# Patient Record
Sex: FEMALE | Race: BLACK OR AFRICAN AMERICAN | Employment: FULL TIME | URBAN - METROPOLITAN AREA
[De-identification: names, ages, dates, MRNs, and addresses within clinical notes are randomized per-mention and may not be internally consistent; named-entity substitution may affect disease eponyms.]

---

## 2017-01-10 ENCOUNTER — OFFICE VISIT (OUTPATIENT)
Dept: INTERNAL MEDICINE CLINIC | Facility: CLINIC | Age: 32
End: 2017-01-10

## 2017-01-10 ENCOUNTER — TELEPHONE (OUTPATIENT)
Dept: INTERNAL MEDICINE CLINIC | Facility: CLINIC | Age: 32
End: 2017-01-10

## 2017-01-10 VITALS
WEIGHT: 226 LBS | BODY MASS INDEX: 41.59 KG/M2 | TEMPERATURE: 98 F | RESPIRATION RATE: 16 BRPM | HEART RATE: 84 BPM | HEIGHT: 62 IN | DIASTOLIC BLOOD PRESSURE: 81 MMHG | SYSTOLIC BLOOD PRESSURE: 138 MMHG

## 2017-01-10 DIAGNOSIS — J40 BRONCHITIS: ICD-10-CM

## 2017-01-10 DIAGNOSIS — IMO0001 COLD: Primary | ICD-10-CM

## 2017-01-10 PROCEDURE — 99213 OFFICE O/P EST LOW 20 MIN: CPT | Performed by: INTERNAL MEDICINE

## 2017-01-10 PROCEDURE — 99212 OFFICE O/P EST SF 10 MIN: CPT | Performed by: INTERNAL MEDICINE

## 2017-01-10 RX ORDER — AMOXICILLIN AND CLAVULANATE POTASSIUM 500; 125 MG/1; MG/1
1 TABLET, FILM COATED ORAL
Qty: 20 TABLET | Refills: 0 | Status: SHIPPED | OUTPATIENT
Start: 2017-01-10 | End: 2017-06-21

## 2017-01-10 NOTE — TELEPHONE ENCOUNTER
Onset: 2 weeks. Pt c/o chest congestion, productive cough ( blood tinged sputum this AM but reports normally is yellow phlegm), nasal congestion, sinus congestion, headache. Pt denied fever, SOB, wheezing.  During conversation, hoarseness in voice noted, an

## 2017-01-10 NOTE — PROGRESS NOTES
HPI:    Patient ID: Nargis Saeed is a 27year old female. Cough  This is a new problem. The current episode started 1 to 4 weeks ago. The problem has been gradually worsening. The problem occurs constantly.  The cough is productive of blood-tinged Lymphadenopathy:     She has no cervical adenopathy. Skin: She is not diaphoretic. Nursing note and vitals reviewed.              ASSESSMENT/PLAN:   Cold  (primary encounter diagnosis)  Bronchitis    No orders of the defined types were placed in this

## 2017-01-10 NOTE — TELEPHONE ENCOUNTER
Noted, thank you Bella  Future Appointments  Date Time Provider Yogi Blair   1/10/2017 11:20 AM Yvan Martinez MD [facility]   5/24/2017 5:40 PM Carolyn Krueger DO [facility]

## 2017-01-10 NOTE — TELEPHONE ENCOUNTER
Pt calling regarding having a really bad cold pt coughing really hard and blood comes up with it. .. please advise

## 2017-04-17 ENCOUNTER — TELEPHONE (OUTPATIENT)
Dept: OBGYN CLINIC | Facility: CLINIC | Age: 32
End: 2017-04-17

## 2017-04-17 RX ORDER — NORGESTIMATE AND ETHINYL ESTRADIOL 7DAYSX3 LO
1 KIT ORAL
Qty: 1 PACKAGE | Refills: 2 | Status: SHIPPED | OUTPATIENT
Start: 2017-04-17 | End: 2017-06-21

## 2017-04-17 NOTE — TELEPHONE ENCOUNTER
Pt informed refill until annual will be sent to pharmacy and to please make sure does not miss appt. Pt states understanding. Pt's last annual with PAULINO was on 5/18/16, last pap negative and HPV positive with negative 16, 18, and 45.  Pt has appt for annual

## 2017-04-26 ENCOUNTER — TELEPHONE (OUTPATIENT)
Dept: INTERNAL MEDICINE CLINIC | Facility: CLINIC | Age: 32
End: 2017-04-26

## 2017-06-15 ENCOUNTER — TELEPHONE (OUTPATIENT)
Dept: INTERNAL MEDICINE CLINIC | Facility: CLINIC | Age: 32
End: 2017-06-15

## 2017-06-15 NOTE — TELEPHONE ENCOUNTER
Pt asking to have her labs ordered prior to THE [facility] appt   Pt stts her labs has to be done @ [facility] and asking for them to be sent to [facility]

## 2017-06-21 ENCOUNTER — LAB ENCOUNTER (OUTPATIENT)
Dept: LAB | Facility: HOSPITAL | Age: 32
End: 2017-06-21
Attending: INTERNAL MEDICINE
Payer: COMMERCIAL

## 2017-06-21 ENCOUNTER — OFFICE VISIT (OUTPATIENT)
Dept: OBGYN CLINIC | Facility: CLINIC | Age: 32
End: 2017-06-21

## 2017-06-21 ENCOUNTER — OFFICE VISIT (OUTPATIENT)
Dept: INTERNAL MEDICINE CLINIC | Facility: CLINIC | Age: 32
End: 2017-06-21

## 2017-06-21 VITALS
DIASTOLIC BLOOD PRESSURE: 80 MMHG | HEIGHT: 61.8 IN | TEMPERATURE: 99 F | RESPIRATION RATE: 20 BRPM | HEART RATE: 74 BPM | SYSTOLIC BLOOD PRESSURE: 122 MMHG | WEIGHT: 219.81 LBS | BODY MASS INDEX: 40.45 KG/M2

## 2017-06-21 VITALS
SYSTOLIC BLOOD PRESSURE: 124 MMHG | BODY MASS INDEX: 40 KG/M2 | DIASTOLIC BLOOD PRESSURE: 84 MMHG | HEART RATE: 77 BPM | WEIGHT: 219.63 LBS

## 2017-06-21 DIAGNOSIS — Z00.00 ANNUAL PHYSICAL EXAM: ICD-10-CM

## 2017-06-21 DIAGNOSIS — Z12.4 SCREENING FOR MALIGNANT NEOPLASM OF CERVIX: ICD-10-CM

## 2017-06-21 DIAGNOSIS — Z00.00 ANNUAL PHYSICAL EXAM: Primary | ICD-10-CM

## 2017-06-21 DIAGNOSIS — R87.810 CERVICAL HIGH RISK HUMAN PAPILLOMAVIRUS (HPV) DNA TEST POSITIVE: ICD-10-CM

## 2017-06-21 DIAGNOSIS — D25.1 FIBROIDS, INTRAMURAL: ICD-10-CM

## 2017-06-21 DIAGNOSIS — Z20.2 EXPOSURE TO CHLAMYDIA: ICD-10-CM

## 2017-06-21 DIAGNOSIS — Z01.419 ENCOUNTER FOR GYNECOLOGICAL EXAMINATION WITHOUT ABNORMAL FINDING: Primary | ICD-10-CM

## 2017-06-21 DIAGNOSIS — N92.0 MENORRHAGIA WITH REGULAR CYCLE: ICD-10-CM

## 2017-06-21 PROCEDURE — 99395 PREV VISIT EST AGE 18-39: CPT | Performed by: OBSTETRICS & GYNECOLOGY

## 2017-06-21 PROCEDURE — 85025 COMPLETE CBC W/AUTO DIFF WBC: CPT

## 2017-06-21 PROCEDURE — 99395 PREV VISIT EST AGE 18-39: CPT | Performed by: INTERNAL MEDICINE

## 2017-06-21 PROCEDURE — 80053 COMPREHEN METABOLIC PANEL: CPT

## 2017-06-21 PROCEDURE — 87389 HIV-1 AG W/HIV-1&-2 AB AG IA: CPT

## 2017-06-21 PROCEDURE — 82306 VITAMIN D 25 HYDROXY: CPT

## 2017-06-21 PROCEDURE — 84443 ASSAY THYROID STIM HORMONE: CPT

## 2017-06-21 PROCEDURE — 86780 TREPONEMA PALLIDUM: CPT

## 2017-06-21 PROCEDURE — 36415 COLL VENOUS BLD VENIPUNCTURE: CPT

## 2017-06-21 PROCEDURE — 80061 LIPID PANEL: CPT

## 2017-06-21 PROCEDURE — 86803 HEPATITIS C AB TEST: CPT

## 2017-06-21 PROCEDURE — 87340 HEPATITIS B SURFACE AG IA: CPT

## 2017-06-21 RX ORDER — AZITHROMYCIN 500 MG/1
1000 TABLET, FILM COATED ORAL ONCE
Qty: 2 TABLET | Refills: 0 | Status: SHIPPED | OUTPATIENT
Start: 2017-06-21 | End: 2017-06-21 | Stop reason: ALTCHOICE

## 2017-06-21 RX ORDER — NORGESTIMATE AND ETHINYL ESTRADIOL 7DAYSX3 LO
1 KIT ORAL
Qty: 1 PACKAGE | Refills: 12 | Status: SHIPPED | OUTPATIENT
Start: 2017-06-21 | End: 2018-02-26

## 2017-06-21 NOTE — PROGRESS NOTES
HPI:    Patient ID: Ankush Graham is a 27year old female. HPI       Annual Preventative Exam  Duran Stovall arrives today for annual preventative physical examination. The patient complains of no new problems and has 0/10 pain.  Patient is currently johanny distension and no mass. There is no tenderness. There is no rebound and no guarding. Musculoskeletal: Normal range of motion. She exhibits no edema (in extremities). Neurological: She is alert and oriented to person, place, and time.    Cranial nerves 2

## 2017-06-21 NOTE — PROGRESS NOTES
HPI:    Patient ID: Raymond Mae is a 27year old female. HPI Nulligravida with menses q 27-28d / 4-5d / heavy and painful menses but worse in past 2 months. OCP has helped with menses until these past 2 months. Known fibroids with US 1 year ago. guarding. Genitourinary: Vagina normal. No breast discharge. There is no rash or lesion on the right labia. There is no rash or lesion on the left labia. Uterus is enlarged (12-14 week size). Uterus is not tender.  Cervix exhibits no motion tenderness and

## 2017-06-22 PROBLEM — D25.1 FIBROIDS, INTRAMURAL: Status: ACTIVE | Noted: 2017-06-22

## 2017-06-23 ENCOUNTER — HOSPITAL ENCOUNTER (OUTPATIENT)
Dept: ULTRASOUND IMAGING | Facility: HOSPITAL | Age: 32
Discharge: HOME | End: 2017-06-23
Attending: OBSTETRICS & GYNECOLOGY
Payer: COMMERCIAL

## 2017-06-23 ENCOUNTER — TELEPHONE (OUTPATIENT)
Dept: OBGYN CLINIC | Facility: CLINIC | Age: 32
End: 2017-06-23

## 2017-06-23 DIAGNOSIS — D25.1 FIBROIDS, INTRAMURAL: ICD-10-CM

## 2017-06-23 PROCEDURE — 76830 TRANSVAGINAL US NON-OB: CPT | Performed by: OBSTETRICS & GYNECOLOGY

## 2017-06-23 PROCEDURE — 76856 US EXAM PELVIC COMPLETE: CPT | Performed by: OBSTETRICS & GYNECOLOGY

## 2017-06-23 NOTE — TELEPHONE ENCOUNTER
Spoke with patient concerning all negative STD tests, HP and pap. SHe is actually in [address] [facility] right now and we'll await results.

## 2017-06-24 ENCOUNTER — TELEPHONE (OUTPATIENT)
Dept: OBGYN CLINIC | Facility: CLINIC | Age: 32
End: 2017-06-24

## 2017-06-24 NOTE — TELEPHONE ENCOUNTER
PT NOTIFIED ALL TESTS WERE NORMAL/NEGATIVE. ADVISED STD RESULTS DO NOT APPEAR IN MY CHART AND [employer] that [facility] BE EMAILED. SHE ASKED THAT A COPY OF THE STD BLOOD WORK BE MAILED TO HER.   ADDRESS CONFIRMED AND STD BLOOD WORK AND GC/CHLAMYDIA MAILED TO PT.

## 2017-06-24 NOTE — TELEPHONE ENCOUNTER
PT stated she was locked out mychart advised to reactivate acc. Pt also would like std results emailed.    Estella Medina to [facility] [phone]

## 2017-07-10 NOTE — TELEPHONE ENCOUNTER
Recent blood tests for vitamin D was normal at 51.8. Normal TSH/thyroid test at 0.48. Normal comprehensive metabolic panel. Normal cholesterol panel with LDL of 120, triglycerides 60 cc, non-HDL cholesterol was 133 and HDL was normal at 67.   Unremarkabl

## 2017-09-01 ENCOUNTER — NURSE TRIAGE (OUTPATIENT)
Dept: OTHER | Age: 32
End: 2017-09-01

## 2017-09-01 NOTE — TELEPHONE ENCOUNTER
Action Requested: Summary for Provider     []  Critical Lab, Recommendations Needed  [] Need Additional Advice  []   FYI    []   Need Orders  [] Need Medications Sent to Pharmacy  []  Other     SUMMARY: pt reports cough, chest congestion.   Denies fever, so

## 2017-09-02 ENCOUNTER — OFFICE VISIT (OUTPATIENT)
Dept: INTERNAL MEDICINE CLINIC | Facility: CLINIC | Age: 32
End: 2017-09-02

## 2017-09-02 VITALS
SYSTOLIC BLOOD PRESSURE: 135 MMHG | HEIGHT: 62 IN | WEIGHT: 223.44 LBS | TEMPERATURE: 98 F | DIASTOLIC BLOOD PRESSURE: 86 MMHG | BODY MASS INDEX: 41.12 KG/M2 | HEART RATE: 87 BPM

## 2017-09-02 DIAGNOSIS — J06.9 ACUTE URI: Primary | ICD-10-CM

## 2017-09-02 PROCEDURE — 99212 OFFICE O/P EST SF 10 MIN: CPT | Performed by: INTERNAL MEDICINE

## 2017-09-02 PROCEDURE — 99213 OFFICE O/P EST LOW 20 MIN: CPT | Performed by: INTERNAL MEDICINE

## 2017-09-02 RX ORDER — CODEINE PHOSPHATE AND GUAIFENESIN 10; 100 MG/5ML; MG/5ML
5 SOLUTION ORAL EVERY 6 HOURS PRN
Qty: 1 BOTTLE | Refills: 0 | Status: SHIPPED | OUTPATIENT
Start: 2017-09-02 | End: 2018-02-14

## 2017-09-02 NOTE — PROGRESS NOTES
Timothy Hawley is a 27year old female. Patient presents with:  Cough: Pain in RT side of back. Stuffy nose. HPI:   Since Wednesday, 3 days ago, she has had symptoms of nasal congestion with yellow drainage and cough productive of yellow sputum. wheezes    ASSESSMENT AND PLAN:   1. Acute URI  Likely viral.  Symptomatic treatment-Cheratussin AC 5 cc 4 times daily as needed. Prescription given. Call if not soon better. Would then consider an empiric course of antibiotics.     The patient indicates

## 2017-09-11 ENCOUNTER — HOSPITAL ENCOUNTER (OUTPATIENT)
Age: 32
Discharge: HOME | End: 2017-09-11
Attending: FAMILY MEDICINE
Payer: COMMERCIAL

## 2017-09-11 VITALS
RESPIRATION RATE: 22 BRPM | DIASTOLIC BLOOD PRESSURE: 85 MMHG | SYSTOLIC BLOOD PRESSURE: 130 MMHG | HEART RATE: 100 BPM | TEMPERATURE: 98 F | OXYGEN SATURATION: 100 %

## 2017-09-11 DIAGNOSIS — L03.113 CELLULITIS OF RIGHT UPPER EXTREMITY: Primary | ICD-10-CM

## 2017-09-11 PROCEDURE — 99213 OFFICE O/P EST LOW 20 MIN: CPT

## 2017-09-11 PROCEDURE — 99204 OFFICE O/P NEW MOD 45 MIN: CPT

## 2017-09-11 RX ORDER — CEPHALEXIN 500 MG/1
500 CAPSULE ORAL 3 TIMES DAILY
Qty: 30 CAPSULE | Refills: 0 | Status: SHIPPED | OUTPATIENT
Start: 2017-09-11 | End: 2017-09-21

## 2017-09-11 NOTE — ED INITIAL ASSESSMENT (HPI)
Pt reports swelling, itching, pain to dorsal aspect of right hand, rates pain 5/10, describes as a tightness. States that she was at a family part outside on Saturday and when she woke up on Sunday and noted swelling.  Soaked it in epsom salts with some rel

## 2017-09-11 NOTE — ED PROVIDER NOTES
Patient Seen in: [address]    History   Patient presents with:  Rash Skin Problem (integumentary)    Stated Complaint: INSECT BITE    HPI    58-year-old female presents to days after possible bug bite on the top of her Physical Exam   Constitutional: She is oriented to person, place, and time. She appears well-developed and well-nourished. Cardiovascular: Normal rate, regular rhythm, normal heart sounds and intact distal pulses.     Pulmonary/Chest: Effort barbara

## 2017-09-11 NOTE — ED NOTES
Pt given discharge instructions and prescription, verbalizes understanding. Denies further questions. Encouraged to call with questions and go to ED with new or worsening symptoms. Ambulatory out of immediate care with steady gait in no apparent distress.

## 2018-02-12 ENCOUNTER — TELEPHONE (OUTPATIENT)
Dept: PEDIATRICS CLINIC | Facility: CLINIC | Age: 33
End: 2018-02-12

## 2018-02-12 DIAGNOSIS — D25.1 INTRAMURAL LEIOMYOMA OF UTERUS: Primary | ICD-10-CM

## 2018-02-12 DIAGNOSIS — N92.0 MENORRHAGIA WITH REGULAR CYCLE: ICD-10-CM

## 2018-02-12 NOTE — TELEPHONE ENCOUNTER
Pt has an appt with PAULINO on 2/14/18. She wants to know if she can do an ultrasound before her appt with PAULINO for fibroids. Pt states she has fibroids and she has had been feeling pressure and discomfort in her lower abd area, below her belly button.   Eyad coe

## 2018-02-12 NOTE — TELEPHONE ENCOUNTER
Chart and previous US reviewed. I will place order as f/u to June 2017 imaging but I'm not sure if radiology has appt open. This is not an emergency and could be done after visit. I'd like her to check CBC as well. Orders are sent.

## 2018-02-14 ENCOUNTER — APPOINTMENT (OUTPATIENT)
Dept: LAB | Facility: HOSPITAL | Age: 33
End: 2018-02-14
Attending: OBSTETRICS & GYNECOLOGY
Payer: COMMERCIAL

## 2018-02-14 ENCOUNTER — OFFICE VISIT (OUTPATIENT)
Dept: OBGYN CLINIC | Facility: CLINIC | Age: 33
End: 2018-02-14

## 2018-02-14 VITALS
DIASTOLIC BLOOD PRESSURE: 82 MMHG | HEART RATE: 78 BPM | BODY MASS INDEX: 42 KG/M2 | WEIGHT: 229 LBS | SYSTOLIC BLOOD PRESSURE: 126 MMHG

## 2018-02-14 DIAGNOSIS — D50.9 MICROCYTIC ANEMIA: ICD-10-CM

## 2018-02-14 DIAGNOSIS — N92.0 MENORRHAGIA WITH REGULAR CYCLE: Primary | ICD-10-CM

## 2018-02-14 DIAGNOSIS — N92.0 MENORRHAGIA WITH REGULAR CYCLE: ICD-10-CM

## 2018-02-14 DIAGNOSIS — Z11.3 VENEREAL DISEASE SCREENING: ICD-10-CM

## 2018-02-14 DIAGNOSIS — D25.1 INTRAMURAL LEIOMYOMA OF UTERUS: ICD-10-CM

## 2018-02-14 LAB
ERYTHROCYTE [DISTWIDTH] IN BLOOD BY AUTOMATED COUNT: 15.9 % (ref 11–15)
HCT VFR BLD AUTO: 37.7 % (ref 35–48)
HGB BLD-MCNC: 11.8 G/DL (ref 12–16)
MCH RBC QN AUTO: 24.4 PG (ref 27–32)
MCHC RBC AUTO-ENTMCNC: 31.2 G/DL (ref 32–37)
MCV RBC AUTO: 78.1 FL (ref 80–100)
PLATELET # BLD AUTO: 317 K/UL (ref 140–400)
PMV BLD AUTO: 8.5 FL (ref 7.4–10.3)
RBC # BLD AUTO: 4.83 M/UL (ref 3.7–5.4)
WBC # BLD AUTO: 7.5 K/UL (ref 4–11)

## 2018-02-14 PROCEDURE — 36415 COLL VENOUS BLD VENIPUNCTURE: CPT

## 2018-02-14 PROCEDURE — 85027 COMPLETE CBC AUTOMATED: CPT

## 2018-02-14 PROCEDURE — 99213 OFFICE O/P EST LOW 20 MIN: CPT | Performed by: OBSTETRICS & GYNECOLOGY

## 2018-02-14 RX ORDER — NORGESTIMATE AND ETHINYL ESTRADIOL 7DAYSX3 28
1 KIT ORAL
Refills: 12 | COMMUNITY
Start: 2018-01-24 | End: 2018-06-27

## 2018-02-16 ENCOUNTER — HOSPITAL ENCOUNTER (OUTPATIENT)
Dept: ULTRASOUND IMAGING | Facility: HOSPITAL | Age: 33
Discharge: HOME | End: 2018-02-16
Attending: OBSTETRICS & GYNECOLOGY
Payer: COMMERCIAL

## 2018-02-16 ENCOUNTER — APPOINTMENT (OUTPATIENT)
Dept: LAB | Facility: HOSPITAL | Age: 33
End: 2018-02-16
Attending: OBSTETRICS & GYNECOLOGY
Payer: COMMERCIAL

## 2018-02-16 DIAGNOSIS — N92.0 MENORRHAGIA WITH REGULAR CYCLE: ICD-10-CM

## 2018-02-16 DIAGNOSIS — Z11.3 VENEREAL DISEASE SCREENING: ICD-10-CM

## 2018-02-16 DIAGNOSIS — D25.1 INTRAMURAL LEIOMYOMA OF UTERUS: ICD-10-CM

## 2018-02-16 LAB
C TRACH DNA SPEC QL NAA+PROBE: NEGATIVE
N GONORRHOEA DNA SPEC QL NAA+PROBE: NEGATIVE
T VAGINALIS RRNA SPEC QL NAA+PROBE: NEGATIVE

## 2018-02-16 PROCEDURE — 87389 HIV-1 AG W/HIV-1&-2 AB AG IA: CPT

## 2018-02-16 PROCEDURE — 86780 TREPONEMA PALLIDUM: CPT

## 2018-02-16 PROCEDURE — 36415 COLL VENOUS BLD VENIPUNCTURE: CPT

## 2018-02-16 PROCEDURE — 86803 HEPATITIS C AB TEST: CPT

## 2018-02-16 PROCEDURE — 76856 US EXAM PELVIC COMPLETE: CPT | Performed by: OBSTETRICS & GYNECOLOGY

## 2018-02-16 PROCEDURE — 76830 TRANSVAGINAL US NON-OB: CPT | Performed by: OBSTETRICS & GYNECOLOGY

## 2018-02-16 PROCEDURE — 87340 HEPATITIS B SURFACE AG IA: CPT

## 2018-02-19 ENCOUNTER — TELEPHONE (OUTPATIENT)
Dept: OBGYN CLINIC | Facility: CLINIC | Age: 33
End: 2018-02-19

## 2018-02-19 DIAGNOSIS — N92.0 MENORRHAGIA WITH REGULAR CYCLE: Primary | ICD-10-CM

## 2018-02-19 DIAGNOSIS — D25.9 UTERINE LEIOMYOMA, UNSPECIFIED LOCATION: ICD-10-CM

## 2018-02-19 LAB
HBV SURFACE AG SERPL QL IA: NONREACTIVE
HCV AB SERPL QL IA: NONREACTIVE
HIV1+2 AB SERPL QL IA: NONREACTIVE
T PALLIDUM AB SER QL: NEGATIVE

## 2018-02-19 NOTE — TELEPHONE ENCOUNTER
Pt spoke to PAULINO about abdominal myomectomy (see 2/16 US result note). She wants to schedule the surgery. Pt aware this will be sent to surgery scheduler. Routed to Roxana to please call pt, thanks.

## 2018-02-19 NOTE — TELEPHONE ENCOUNTER
OB GYN SURGICAL SCHEDULING    Assessment: Menorrhagia with regular cycle and Uterine fibroids    Pre-Operative Procedure:  Laparotomy with abdominal myomectomy    Side: neither    In / on:     Date:      Admission:  AM Admit    Anesthesia: General    Addit

## 2018-02-19 NOTE — TELEPHONE ENCOUNTER
Spoke with patient about tentative date 2/26 and 2/27. Patient informed I will c/b to confirm when Md confirms his availability.

## 2018-02-21 ENCOUNTER — TELEPHONE (OUTPATIENT)
Dept: OBGYN CLINIC | Facility: CLINIC | Age: 33
End: 2018-02-21

## 2018-02-21 NOTE — TELEPHONE ENCOUNTER
FMLA form received in [employer]. Logged for processing. [employer] packet emailed to pt ([email or web address]).  NK

## 2018-02-21 NOTE — TELEPHONE ENCOUNTER
Roxana, I am not returning from out of town until that evening. That's why I moved the other . Alivia 105 to Tuesday AM. Is Wed AM the 14th available?

## 2018-02-21 NOTE — TELEPHONE ENCOUNTER
LA paperwork received via and given to [facility] at the [employer] department.

## 2018-02-21 NOTE — TELEPHONE ENCOUNTER
Spoke with patient and talked about tentative procedure date 3/9/18.  Awaiting a confirmation from MD.

## 2018-02-22 NOTE — TELEPHONE ENCOUNTER
Patient is scheduled 3/2/18 at 2pm Laparotomy with abdominal myomectomy PAULINO/BRADEN. Patient's instructions routed via [employer]. Patient informed.      Per patient's insurance it is billed as outpatient (23 hour observation) and if the patient was exceeds 23 mounika

## 2018-02-26 NOTE — TELEPHONE ENCOUNTER
Dr. Richie Zimmerman    Please sign off on form:  -Highlight the patient and hit \"Chart\" button. -In Chart Review, w/in the Encounter tab - click 1 time on the Telephone call encounter for 2-21-18.  Scroll down the telephone encounter.  -Click \"scan on\" blue Hy

## 2018-02-26 NOTE — PROGRESS NOTES
HPI:    Patient ID: Raymond Mae is a 34year old female. HPI  Nulligravida with menses q 28d / 4-7d / 5 extreme flow days despite OCP use for BC and menstrual control.  No current plan for pregnancy but wishes to discuss fibroids and future pregn Genitourinary: Vagina normal. No breast discharge. There is no rash or lesion on the right labia. There is no rash or lesion on the left labia. Uterus is enlarged (14 week size. ). Uterus is not tender.  Cervix exhibits no motion tenderness and no dischar

## 2018-03-01 ENCOUNTER — TELEPHONE (OUTPATIENT)
Dept: OBGYN CLINIC | Facility: CLINIC | Age: 33
End: 2018-03-01

## 2018-03-01 ENCOUNTER — LAB ENCOUNTER (OUTPATIENT)
Dept: LAB | Facility: HOSPITAL | Age: 33
End: 2018-03-01
Attending: OBSTETRICS & GYNECOLOGY
Payer: COMMERCIAL

## 2018-03-01 DIAGNOSIS — Z01.818 PRE-OP TESTING: ICD-10-CM

## 2018-03-01 LAB
ANTIBODY SCREEN: NEGATIVE
RH BLOOD TYPE: POSITIVE

## 2018-03-01 PROCEDURE — 86900 BLOOD TYPING SEROLOGIC ABO: CPT

## 2018-03-01 PROCEDURE — 86901 BLOOD TYPING SEROLOGIC RH(D): CPT

## 2018-03-01 PROCEDURE — 36415 COLL VENOUS BLD VENIPUNCTURE: CPT

## 2018-03-01 PROCEDURE — 86850 RBC ANTIBODY SCREEN: CPT

## 2018-03-01 NOTE — TELEPHONE ENCOUNTER
Per the pt she has surgery scheduled for tomorrow, and would like to know if she can eat before hand. Please advise.

## 2018-03-01 NOTE — TELEPHONE ENCOUNTER
Pt clarifying diet for today, having laparotomy tomorrow. Pt advised that per her pre op instructions, she can only consume clear liquids today. Pt heading to hospital for labs now and will do enema tonight.  Pt advised to call back with any further questio

## 2018-03-02 ENCOUNTER — SURGERY (OUTPATIENT)
Age: 33
End: 2018-03-02

## 2018-03-02 ENCOUNTER — HOSPITAL ENCOUNTER (INPATIENT)
Facility: HOSPITAL | Age: 33
LOS: 3 days | Discharge: HOME | DRG: 742 | End: 2018-03-05
Attending: OBSTETRICS & GYNECOLOGY | Admitting: OBSTETRICS & GYNECOLOGY
Payer: COMMERCIAL

## 2018-03-02 ENCOUNTER — ANESTHESIA EVENT (OUTPATIENT)
Dept: SURGERY | Facility: HOSPITAL | Age: 33
DRG: 742 | End: 2018-03-02
Payer: COMMERCIAL

## 2018-03-02 ENCOUNTER — ANESTHESIA (OUTPATIENT)
Dept: SURGERY | Facility: HOSPITAL | Age: 33
DRG: 742 | End: 2018-03-02
Payer: COMMERCIAL

## 2018-03-02 DIAGNOSIS — N92.0 MENORRHAGIA WITH REGULAR CYCLE: ICD-10-CM

## 2018-03-02 DIAGNOSIS — D25.9 UTERINE LEIOMYOMA, UNSPECIFIED LOCATION: ICD-10-CM

## 2018-03-02 DIAGNOSIS — Z01.818 PRE-OP TESTING: Primary | ICD-10-CM

## 2018-03-02 PROBLEM — Z98.890 STATUS POST MYOMECTOMY: Status: ACTIVE | Noted: 2018-03-02

## 2018-03-02 PROBLEM — D50.9 HYPOCHROMIC MICROCYTIC ANEMIA: Status: ACTIVE | Noted: 2018-03-02

## 2018-03-02 LAB — B-HCG UR QL: NEGATIVE

## 2018-03-02 PROCEDURE — 58140 MYOMECTOMY ABDOM METHOD: CPT | Performed by: OBSTETRICS & GYNECOLOGY

## 2018-03-02 PROCEDURE — 0UB90ZZ EXCISION OF UTERUS, OPEN APPROACH: ICD-10-PCS | Performed by: OBSTETRICS & GYNECOLOGY

## 2018-03-02 PROCEDURE — 3E0P05Z INTRODUCTION OF ADHESION BARRIER INTO FEMALE REPRODUCTIVE, OPEN APPROACH: ICD-10-PCS | Performed by: OBSTETRICS & GYNECOLOGY

## 2018-03-02 RX ORDER — SODIUM CHLORIDE, SODIUM LACTATE, POTASSIUM CHLORIDE, CALCIUM CHLORIDE 600; 310; 30; 20 MG/100ML; MG/100ML; MG/100ML; MG/100ML
INJECTION, SOLUTION INTRAVENOUS CONTINUOUS
Status: DISCONTINUED | OUTPATIENT
Start: 2018-03-02 | End: 2018-03-05

## 2018-03-02 RX ORDER — MORPHINE SULFATE 2 MG/ML
2 INJECTION, SOLUTION INTRAMUSCULAR; INTRAVENOUS EVERY 10 MIN PRN
Status: DISCONTINUED | OUTPATIENT
Start: 2018-03-02 | End: 2018-03-02 | Stop reason: HOSPADM

## 2018-03-02 RX ORDER — ONDANSETRON 4 MG/1
4 TABLET, FILM COATED ORAL EVERY 8 HOURS PRN
Status: DISCONTINUED | OUTPATIENT
Start: 2018-03-02 | End: 2018-03-05

## 2018-03-02 RX ORDER — ONDANSETRON 2 MG/ML
4 INJECTION INTRAMUSCULAR; INTRAVENOUS EVERY 8 HOURS PRN
Status: DISCONTINUED | OUTPATIENT
Start: 2018-03-02 | End: 2018-03-05

## 2018-03-02 RX ORDER — FAMOTIDINE 20 MG/1
20 TABLET ORAL ONCE
Status: COMPLETED | OUTPATIENT
Start: 2018-03-02 | End: 2018-03-02

## 2018-03-02 RX ORDER — ONDANSETRON 2 MG/ML
4 INJECTION INTRAMUSCULAR; INTRAVENOUS ONCE AS NEEDED
Status: DISCONTINUED | OUTPATIENT
Start: 2018-03-02 | End: 2018-03-02 | Stop reason: HOSPADM

## 2018-03-02 RX ORDER — DOCUSATE SODIUM 100 MG/1
100 CAPSULE, LIQUID FILLED ORAL DAILY
Status: DISCONTINUED | OUTPATIENT
Start: 2018-03-02 | End: 2018-03-05

## 2018-03-02 RX ORDER — ACETAMINOPHEN 500 MG
1000 TABLET ORAL ONCE
Status: COMPLETED | OUTPATIENT
Start: 2018-03-02 | End: 2018-03-02

## 2018-03-02 RX ORDER — ROCURONIUM BROMIDE 10 MG/ML
INJECTION, SOLUTION INTRAVENOUS AS NEEDED
Status: DISCONTINUED | OUTPATIENT
Start: 2018-03-02 | End: 2018-03-02 | Stop reason: SURG

## 2018-03-02 RX ORDER — SODIUM CHLORIDE 0.9 % (FLUSH) 0.9 %
10 SYRINGE (ML) INJECTION AS NEEDED
Status: DISCONTINUED | OUTPATIENT
Start: 2018-03-02 | End: 2018-03-02 | Stop reason: HOSPADM

## 2018-03-02 RX ORDER — NALOXONE HYDROCHLORIDE 0.4 MG/ML
80 INJECTION, SOLUTION INTRAMUSCULAR; INTRAVENOUS; SUBCUTANEOUS AS NEEDED
Status: DISCONTINUED | OUTPATIENT
Start: 2018-03-02 | End: 2018-03-02 | Stop reason: HOSPADM

## 2018-03-02 RX ORDER — MORPHINE SULFATE 2 MG/ML
2 INJECTION, SOLUTION INTRAMUSCULAR; INTRAVENOUS EVERY 30 MIN PRN
Status: DISCONTINUED | OUTPATIENT
Start: 2018-03-02 | End: 2018-03-05

## 2018-03-02 RX ORDER — BISACODYL 10 MG
10 SUPPOSITORY, RECTAL RECTAL
Status: DISCONTINUED | OUTPATIENT
Start: 2018-03-02 | End: 2018-03-05

## 2018-03-02 RX ORDER — DEXTROSE, SODIUM CHLORIDE, SODIUM LACTATE, POTASSIUM CHLORIDE, AND CALCIUM CHLORIDE 5; .6; .31; .03; .02 G/100ML; G/100ML; G/100ML; G/100ML; G/100ML
INJECTION, SOLUTION INTRAVENOUS CONTINUOUS
Status: DISCONTINUED | OUTPATIENT
Start: 2018-03-02 | End: 2018-03-04

## 2018-03-02 RX ORDER — LIDOCAINE HYDROCHLORIDE 10 MG/ML
INJECTION, SOLUTION EPIDURAL; INFILTRATION; INTRACAUDAL; PERINEURAL AS NEEDED
Status: DISCONTINUED | OUTPATIENT
Start: 2018-03-02 | End: 2018-03-02 | Stop reason: SURG

## 2018-03-02 RX ORDER — SODIUM CHLORIDE 0.9 % (FLUSH) 0.9 %
10 SYRINGE (ML) INJECTION AS NEEDED
Status: DISCONTINUED | OUTPATIENT
Start: 2018-03-02 | End: 2018-03-05

## 2018-03-02 RX ORDER — NALBUPHINE HCL 10 MG/ML
2.5 AMPUL (ML) INJECTION EVERY 4 HOURS PRN
Status: DISCONTINUED | OUTPATIENT
Start: 2018-03-02 | End: 2018-03-05

## 2018-03-02 RX ORDER — HEPARIN SODIUM 5000 [USP'U]/ML
5000 INJECTION, SOLUTION INTRAVENOUS; SUBCUTANEOUS ONCE
Status: DISCONTINUED | OUTPATIENT
Start: 2018-03-03 | End: 2018-03-02

## 2018-03-02 RX ORDER — HEPARIN SODIUM 5000 [USP'U]/ML
5000 INJECTION, SOLUTION INTRAVENOUS; SUBCUTANEOUS ONCE
Status: COMPLETED | OUTPATIENT
Start: 2018-03-02 | End: 2018-03-02

## 2018-03-02 RX ORDER — MORPHINE SULFATE 10 MG/ML
6 INJECTION, SOLUTION INTRAMUSCULAR; INTRAVENOUS EVERY 10 MIN PRN
Status: DISCONTINUED | OUTPATIENT
Start: 2018-03-02 | End: 2018-03-02 | Stop reason: HOSPADM

## 2018-03-02 RX ORDER — DIPHENHYDRAMINE HYDROCHLORIDE 50 MG/ML
12.5 INJECTION INTRAMUSCULAR; INTRAVENOUS EVERY 4 HOURS PRN
Status: DISCONTINUED | OUTPATIENT
Start: 2018-03-02 | End: 2018-03-05

## 2018-03-02 RX ORDER — GLYCOPYRROLATE 0.2 MG/ML
INJECTION INTRAMUSCULAR; INTRAVENOUS AS NEEDED
Status: DISCONTINUED | OUTPATIENT
Start: 2018-03-02 | End: 2018-03-02 | Stop reason: SURG

## 2018-03-02 RX ORDER — POLYETHYLENE GLYCOL 3350 17 G/17G
17 POWDER, FOR SOLUTION ORAL DAILY PRN
Status: DISCONTINUED | OUTPATIENT
Start: 2018-03-02 | End: 2018-03-05

## 2018-03-02 RX ORDER — METOCLOPRAMIDE 10 MG/1
10 TABLET ORAL ONCE
Status: COMPLETED | OUTPATIENT
Start: 2018-03-02 | End: 2018-03-02

## 2018-03-02 RX ORDER — NALOXONE HYDROCHLORIDE 0.4 MG/ML
0.08 INJECTION, SOLUTION INTRAMUSCULAR; INTRAVENOUS; SUBCUTANEOUS
Status: DISCONTINUED | OUTPATIENT
Start: 2018-03-02 | End: 2018-03-05

## 2018-03-02 RX ORDER — DEXAMETHASONE SODIUM PHOSPHATE 4 MG/ML
VIAL (ML) INJECTION AS NEEDED
Status: DISCONTINUED | OUTPATIENT
Start: 2018-03-02 | End: 2018-03-02 | Stop reason: SURG

## 2018-03-02 RX ORDER — 0.9 % SODIUM CHLORIDE 0.9 %
VIAL (ML) INJECTION AS NEEDED
Status: DISCONTINUED | OUTPATIENT
Start: 2018-03-02 | End: 2018-03-02 | Stop reason: HOSPADM

## 2018-03-02 RX ORDER — KETOROLAC TROMETHAMINE 30 MG/ML
30 INJECTION, SOLUTION INTRAMUSCULAR; INTRAVENOUS EVERY 6 HOURS
Status: DISCONTINUED | OUTPATIENT
Start: 2018-03-02 | End: 2018-03-04

## 2018-03-02 RX ORDER — NEOSTIGMINE METHYLSULFATE 0.5 MG/ML
INJECTION INTRAVENOUS AS NEEDED
Status: DISCONTINUED | OUTPATIENT
Start: 2018-03-02 | End: 2018-03-02 | Stop reason: SURG

## 2018-03-02 RX ORDER — HEPARIN SODIUM 5000 [USP'U]/ML
5000 INJECTION, SOLUTION INTRAVENOUS; SUBCUTANEOUS EVERY 8 HOURS SCHEDULED
Status: DISCONTINUED | OUTPATIENT
Start: 2018-03-02 | End: 2018-03-05

## 2018-03-02 RX ORDER — CEFAZOLIN SODIUM/WATER 2 G/20 ML
2 SYRINGE (ML) INTRAVENOUS ONCE
Status: COMPLETED | OUTPATIENT
Start: 2018-03-02 | End: 2018-03-02

## 2018-03-02 RX ORDER — SODIUM CHLORIDE, SODIUM LACTATE, POTASSIUM CHLORIDE, CALCIUM CHLORIDE 600; 310; 30; 20 MG/100ML; MG/100ML; MG/100ML; MG/100ML
INJECTION, SOLUTION INTRAVENOUS CONTINUOUS
Status: DISCONTINUED | OUTPATIENT
Start: 2018-03-02 | End: 2018-03-02 | Stop reason: HOSPADM

## 2018-03-02 RX ORDER — MIDAZOLAM HYDROCHLORIDE 1 MG/ML
INJECTION INTRAMUSCULAR; INTRAVENOUS AS NEEDED
Status: DISCONTINUED | OUTPATIENT
Start: 2018-03-02 | End: 2018-03-02 | Stop reason: SURG

## 2018-03-02 RX ORDER — HYDROCODONE BITARTRATE AND ACETAMINOPHEN 5; 325 MG/1; MG/1
1 TABLET ORAL AS NEEDED
Status: DISCONTINUED | OUTPATIENT
Start: 2018-03-02 | End: 2018-03-02 | Stop reason: HOSPADM

## 2018-03-02 RX ORDER — HYDROCODONE BITARTRATE AND ACETAMINOPHEN 5; 325 MG/1; MG/1
2 TABLET ORAL AS NEEDED
Status: DISCONTINUED | OUTPATIENT
Start: 2018-03-02 | End: 2018-03-02 | Stop reason: HOSPADM

## 2018-03-02 RX ORDER — MORPHINE SULFATE 4 MG/ML
4 INJECTION, SOLUTION INTRAMUSCULAR; INTRAVENOUS EVERY 10 MIN PRN
Status: DISCONTINUED | OUTPATIENT
Start: 2018-03-02 | End: 2018-03-02 | Stop reason: HOSPADM

## 2018-03-02 RX ORDER — ONDANSETRON 2 MG/ML
4 INJECTION INTRAMUSCULAR; INTRAVENOUS EVERY 6 HOURS PRN
Status: DISCONTINUED | OUTPATIENT
Start: 2018-03-02 | End: 2018-03-05

## 2018-03-02 RX ORDER — HALOPERIDOL 5 MG/ML
0.25 INJECTION INTRAMUSCULAR ONCE AS NEEDED
Status: DISCONTINUED | OUTPATIENT
Start: 2018-03-02 | End: 2018-03-02 | Stop reason: HOSPADM

## 2018-03-02 RX ADMIN — CEFAZOLIN SODIUM/WATER 2 G: 2 G/20 ML SYRINGE (ML) INTRAVENOUS at 14:40:00

## 2018-03-02 RX ADMIN — LIDOCAINE HYDROCHLORIDE 50 MG: 10 INJECTION, SOLUTION EPIDURAL; INFILTRATION; INTRACAUDAL; PERINEURAL at 14:31:00

## 2018-03-02 RX ADMIN — SODIUM CHLORIDE, SODIUM LACTATE, POTASSIUM CHLORIDE, CALCIUM CHLORIDE: 600; 310; 30; 20 INJECTION, SOLUTION INTRAVENOUS at 16:34:00

## 2018-03-02 RX ADMIN — ROCURONIUM BROMIDE 40 MG: 10 INJECTION, SOLUTION INTRAVENOUS at 14:31:00

## 2018-03-02 RX ADMIN — MIDAZOLAM HYDROCHLORIDE 2 MG: 1 INJECTION INTRAMUSCULAR; INTRAVENOUS at 14:26:00

## 2018-03-02 RX ADMIN — GLYCOPYRROLATE 0.8 MG: 0.2 INJECTION INTRAMUSCULAR; INTRAVENOUS at 16:29:00

## 2018-03-02 RX ADMIN — SODIUM CHLORIDE, SODIUM LACTATE, POTASSIUM CHLORIDE, CALCIUM CHLORIDE: 600; 310; 30; 20 INJECTION, SOLUTION INTRAVENOUS at 14:26:00

## 2018-03-02 RX ADMIN — NEOSTIGMINE METHYLSULFATE 5 MG: 0.5 INJECTION INTRAVENOUS at 16:34:00

## 2018-03-02 RX ADMIN — DEXAMETHASONE SODIUM PHOSPHATE 4 MG: 4 MG/ML VIAL (ML) INJECTION at 14:38:00

## 2018-03-02 NOTE — ANESTHESIA POSTPROCEDURE EVALUATION
Patient: [facility]    Procedure Summary     Date:  03/02/18 Room / Location:  [address] [place] [address] / [address] [place] [address] MAIN OR    Anesthesia Start:  8653 Anesthesia Stop:      Procedure:  LAPAROTOMY MYOMECTOMY (N/A Abdomen) Diagnosis:       Menorrhagia with regular

## 2018-03-02 NOTE — BRIEF OP NOTE
Pre-Operative Diagnosis: Menorrhagia with regular cycle [N92.0]  Uterine leiomyoma, unspecified location [D25.9]     Post-Operative Diagnosis: Menorrhagia with regular cycle [Z06. 0]Uterine leiomyoma, unspecified location [D25.9]     Procedure Performed:

## 2018-03-02 NOTE — ANESTHESIA PREPROCEDURE EVALUATION
Anesthesia PreOp Note    HPI:     Timothy Hawley is a 34year old female who presents for preoperative consultation requested by: Osito Seth DO    Date of Surgery: 3/2/2018    Procedure(s):  LAPAROTOMY MYOMECTOMY  Indication: Menorrhagia with r Used    Alcohol use No    Comment: occ    Drug use: No    Sexual activity: Yes    Partners: Male    Birth control/ protection: Condom, OCP     Other Topics Concern    Caffeine Concern No     Social History Narrative   None on file       Available pre-op la management. All of the patient's questions were answered to the best of my ability. The patient desires the anesthetic management as planned.   Juan Miguel Serrano 44.  3/2/2018 1:56 PM

## 2018-03-02 NOTE — INTERVAL H&P NOTE
Pre-op Diagnosis: Menorrhagia with regular cycle [N92.0]  Uterine leiomyoma, unspecified location [D25.9]    The above referenced H&P was reviewed by Gabe Cardona.  [id number] [facility],  on 3/2/2018, the patient was examined and no significant changes have occurred in the

## 2018-03-02 NOTE — H&P
[facility]    PATIENT'S NAME: Westchester Tian   ATTENDING PHYSICIAN: Hany Bills DO   PATIENT ACCOUNT#:   [de-identified]    LOCATION:    MEDICAL RECORD #:   [id number]       YOB: 1985  ADMISSION DATE:       03/02/2018    HISTO lesions. The patient had a normal Pap smear with negative HPV test on June 21, 2017. She had negative STD screening for chlamydia, gonorrhea, and Trichomonas at the aforementioned last visit, February 14.   On bimanual exam, the uterus is enlarged approxi   d: 03/01/2018 22:27:45  t: 03/02/2018 00:21:44  [facility] [id number]  APULINO/    cc: Ulysses Sober A. Macduff, DO

## 2018-03-03 LAB
ERYTHROCYTE [DISTWIDTH] IN BLOOD BY AUTOMATED COUNT: 15.4 % (ref 11–15)
HCT VFR BLD AUTO: 33.2 % (ref 35–48)
HGB BLD-MCNC: 10.8 G/DL (ref 12–16)
MCH RBC QN AUTO: 25.2 PG (ref 27–32)
MCHC RBC AUTO-ENTMCNC: 32.7 G/DL (ref 32–37)
MCV RBC AUTO: 77.2 FL (ref 80–100)
PLATELET # BLD AUTO: 292 K/UL (ref 140–400)
PMV BLD AUTO: 7.9 FL (ref 7.4–10.3)
RBC # BLD AUTO: 4.3 M/UL (ref 3.7–5.4)
WBC # BLD AUTO: 11.9 K/UL (ref 4–11)

## 2018-03-03 RX ORDER — SIMETHICONE 80 MG
80 TABLET,CHEWABLE ORAL 4 TIMES DAILY PRN
Status: DISCONTINUED | OUTPATIENT
Start: 2018-03-03 | End: 2018-03-05

## 2018-03-03 RX ORDER — HYDROCODONE BITARTRATE AND ACETAMINOPHEN 7.5; 325 MG/1; MG/1
1 TABLET ORAL EVERY 4 HOURS PRN
Status: DISCONTINUED | OUTPATIENT
Start: 2018-03-03 | End: 2018-03-05

## 2018-03-03 RX ORDER — OXYCODONE AND ACETAMINOPHEN 7.5; 325 MG/1; MG/1
1 TABLET ORAL EVERY 4 HOURS PRN
Status: DISCONTINUED | OUTPATIENT
Start: 2018-03-03 | End: 2018-03-05

## 2018-03-03 NOTE — PLAN OF CARE
Patient/Family Goals    • Patient/Family Long Term Goal Progressing    • Patient/Family Short Term Goal Progressing        SKIN/TISSUE INTEGRITY - ADULT    • Skin integrity remains intact Progressing    • Incision(s), wounds(s) or drain site(s) healing wit

## 2018-03-03 NOTE — PROGRESS NOTES
[facility]    OB/GYNE Progress Note      [facility] Patient Status:  Inpatient    10/15/1985 MRN [id number]   Location [facility] 4W/SW/SE Attending [address] [place] [address], [address] [place] [address] Day # 1 PCP Venu Galloway MD     LATE ENTRY ABO AB 03/01/2018   RH Positive 03/01/2018   WBC 11.9 (H) 03/03/2018   HGB 10.8 (L) 03/03/2018   HCT 33.2 (L) 03/03/2018    03/03/2018   CREATSERUM 0.68 06/21/2017   BUN 8 06/21/2017    06/21/2017   K 3.6 06/21/2017    06/21/2017   CO2

## 2018-03-03 NOTE — OPERATIVE REPORT
[facility]    PATIENT'S NAME: Gilbert Green   ATTENDING PHYSICIAN: Hany Nath DO   OPERATING PHYSICIAN: Merlin Pang A. Macduff, DO   PATIENT ACCOUNT#:   [de-identified]    LOCATION:  [address] [place] [address] Dr #:   [id number]       DATE OF in the incision and extended sharply as well. We then placed a large Flako retractor. We then brought the uterus through the incision and on traction. We made a transverse incision across the fundal fibroid.   Using blunt and sharp dissection, we were a noted to be correct. There was no bleeding seen on the rectus layer. The fascia was then closed with a running suture of 0 Vicryl. The subcutaneous tissue was irrigated, and no bleeding was noted here.   I then closed the subcutaneous space with a runnin

## 2018-03-04 RX ORDER — IBUPROFEN 600 MG/1
600 TABLET ORAL EVERY 6 HOURS PRN
Status: DISCONTINUED | OUTPATIENT
Start: 2018-03-04 | End: 2018-03-05

## 2018-03-04 NOTE — PROGRESS NOTES
[facility]    OB/GYNE Progress Note      Cortezlatricia Marklarisa Patient Status:  Inpatient    10/15/1985 MRN [id number]   Location [facility] 4W/SW/SE Attending Len[address] Day # 2 PCP Isabella Vaughn MD       Assessmen (L) 03/03/2018   HCT 33.2 (L) 03/03/2018    03/03/2018   CREATSERUM 0.68 06/21/2017   BUN 8 06/21/2017    06/21/2017   K 3.6 06/21/2017    06/21/2017   CO2 26 06/21/2017   GLU 88 06/21/2017   CA 9.2 06/21/2017   ALB 3.8 06/21/2017   ALKP

## 2018-03-05 VITALS
RESPIRATION RATE: 18 BRPM | HEART RATE: 79 BPM | HEIGHT: 62 IN | WEIGHT: 224.69 LBS | DIASTOLIC BLOOD PRESSURE: 54 MMHG | SYSTOLIC BLOOD PRESSURE: 114 MMHG | OXYGEN SATURATION: 97 % | TEMPERATURE: 98 F | BODY MASS INDEX: 41.35 KG/M2

## 2018-03-05 RX ORDER — IBUPROFEN 600 MG/1
600 TABLET ORAL EVERY 6 HOURS PRN
Qty: 30 TABLET | Refills: 0 | Status: SHIPPED | OUTPATIENT
Start: 2018-03-05 | End: 2018-06-27

## 2018-03-05 RX ORDER — HYDROCODONE BITARTRATE AND ACETAMINOPHEN 7.5; 325 MG/1; MG/1
1 TABLET ORAL EVERY 4 HOURS PRN
Qty: 30 TABLET | Refills: 0 | Status: SHIPPED | OUTPATIENT
Start: 2018-03-05 | End: 2018-06-27

## 2018-03-05 NOTE — DISCHARGE SUMMARY
[facility]  Discharge Summary    Jeffrey Fake Patient Status:  Inpatient    10/15/1985 MRN [id number]   Location [facility] 4W/SW/SE Attending Grace Peck, [facility] Day # 3 PCP Mary Tay MD           Date of Admis

## 2018-03-05 NOTE — PROGRESS NOTES
[facility]    OB/GYNE Progress Note      Deborah King Patient Status:  Inpatient    10/15/1985 MRN [id number]   Location [facility] 4W/SW/SE Attending [address] Day # 3 PCP Helen Choudhury MD       Assessmen 3.6 06/21/2017    06/21/2017   CO2 26 06/21/2017   GLU 88 06/21/2017   CA 9.2 06/21/2017   ALB 3.8 06/21/2017   ALKPHO 35 06/21/2017   BILT 0.7 06/21/2017   TP 7.1 06/21/2017   AST 24 06/21/2017   ALT 28 06/21/2017   TSH 0.48 06/21/2017         Lab R

## 2018-03-16 NOTE — PAYOR COMM NOTE
--------------  ADMISSION REVIEW     Payor: Mirela Bermudez #:  [id number]  Authorization Number: [id number]    Admit date: 3/2/18  Admit time: 56         HISTORY AND PHYSICAL EXAMINATION    HISTORY OF PRESENT ILLNESS:  This is a 18-year-old 2017.  She had negative STD screening for chlamydia, gonorrhea, and Trichomonas at the aforementioned last visit, February 14. On bimanual exam, the uterus is enlarged approximately 14 weeks' size.   There is no palpable evidence of adnexal mass or tendern microcytic anemia          Status post myomectomy  POD 1: Discussed goals for ambulation, passing flatus, regular diet, PO narcotics and discontinuing dressing later today           Subjective      Review of Systems:  Rizzo out this AM and already up to ur °F (37 °C)] 97.6 °F (36.4 °C)  Pulse:  [87-95] 95  Resp:  [18] 18  BP: (109-141)/(52-87) 141/87     Input/Output:     Intake/Output Summary (Last 24 hours) at 03/04/18 1341  Last data filed at 03/04/18 1134    Gross per 24 hour   Intake              220 ml

## 2018-03-19 ENCOUNTER — TELEPHONE (OUTPATIENT)
Dept: OBGYN CLINIC | Facility: CLINIC | Age: 33
End: 2018-03-19

## 2018-03-19 NOTE — TELEPHONE ENCOUNTER
Spoke to patient and gave her ref# from call made to her insurer prior to surgery and informed her the procedure was billed as outpatient.

## 2018-03-19 NOTE — TELEPHONE ENCOUNTER
Kristie prieto is calling and said that  Her surgery is pended and not approved .  Was it planned as a out patient and ended up being in patient , Please call [employer] ,

## 2018-03-21 ENCOUNTER — TELEPHONE (OUTPATIENT)
Dept: OBGYN CLINIC | Facility: CLINIC | Age: 33
End: 2018-03-21

## 2018-03-21 NOTE — TELEPHONE ENCOUNTER
Pt had surgery with PAULINO 2 weeks ago. States she has several incisions and took the tape off the large incision Sunday as directed. She now has some throbbing pains where the tape was. \"Not bad pain, just hurts, maybe irritated from the tape\".  Her large i

## 2018-03-28 NOTE — PAYOR COMM NOTE
--------------  CONTINUED STAY REVIEW    Payor: Mirela Bermudez #:  [id number]      --------------  DISCHARGE REVIEW    Payor: Mirela Bermudez #:  [id number]  Authorization Number: [id number]    Admit date: 3/2/18  Admit time:  15 COMMENTS  Authorization Number: [id number]    Admit date: 3/2/18  Admit time: 56    Admitting Physician: Jas Cruz DO  Attending Physician:  No att. providers found  Primary Care Physician: Thomas Shahid MD        PATIENT'S NAME: Олег Nava

## 2018-04-09 ENCOUNTER — OFFICE VISIT (OUTPATIENT)
Dept: OBGYN CLINIC | Facility: CLINIC | Age: 33
End: 2018-04-09

## 2018-04-09 VITALS
BODY MASS INDEX: 42 KG/M2 | HEART RATE: 93 BPM | SYSTOLIC BLOOD PRESSURE: 125 MMHG | WEIGHT: 227 LBS | DIASTOLIC BLOOD PRESSURE: 76 MMHG

## 2018-04-09 DIAGNOSIS — Z98.890 POST-OPERATIVE STATE: Primary | ICD-10-CM

## 2018-04-09 PROCEDURE — 99024 POSTOP FOLLOW-UP VISIT: CPT | Performed by: OBSTETRICS & GYNECOLOGY

## 2018-04-20 NOTE — PROGRESS NOTES
Post Op: No complaints. No bleeding. PE: Incision healed well. Abdomen soft and non-tender    IMP: Normal Post Op     PLAN:  RTW note for 04-30-18. Bleeding instructions, Annual in July.

## 2018-06-27 ENCOUNTER — OFFICE VISIT (OUTPATIENT)
Dept: OBGYN CLINIC | Facility: CLINIC | Age: 33
End: 2018-06-27

## 2018-06-27 VITALS
DIASTOLIC BLOOD PRESSURE: 80 MMHG | HEIGHT: 62.25 IN | SYSTOLIC BLOOD PRESSURE: 127 MMHG | WEIGHT: 229.19 LBS | HEART RATE: 71 BPM | BODY MASS INDEX: 41.64 KG/M2

## 2018-06-27 DIAGNOSIS — Z01.419 ENCOUNTER FOR GYNECOLOGICAL EXAMINATION WITHOUT ABNORMAL FINDING: Primary | ICD-10-CM

## 2018-06-27 PROCEDURE — 99395 PREV VISIT EST AGE 18-39: CPT | Performed by: OBSTETRICS & GYNECOLOGY

## 2018-06-27 RX ORDER — NORGESTIMATE AND ETHINYL ESTRADIOL 7DAYSX3 28
1 KIT ORAL
Qty: 3 PACKAGE | Refills: 3 | Status: SHIPPED | OUTPATIENT
Start: 2018-06-27 | End: 2019-06-17

## 2018-06-27 NOTE — PROGRESS NOTES
HPI:    Patient ID: Carmelo Yen is a 34year old female. HPI Nulligravida with reg menses on OC for BC. No complaints. Review of Systems   Constitutional: Negative for appetite change, fatigue and unexpected weight change.    Eyes: Negative f and not tender. Cervix exhibits no motion tenderness and no discharge. Right adnexum displays no mass, no tenderness and no fullness. Left adnexum displays no mass, no tenderness and no fullness. No vaginal discharge found.    Musculoskeletal: She exhibits

## 2018-07-11 RX ORDER — NORGESTIMATE AND ETHINYL ESTRADIOL 7DAYSX3 28
1 KIT ORAL
Qty: 28 TABLET | Refills: 12 | OUTPATIENT
Start: 2018-07-11

## 2018-07-11 NOTE — TELEPHONE ENCOUNTER
LAST ANNUAL 6-27-18 AND RX WAS SENT TO PHARMACY. SENT BACK RX DENIED, RESPONDED TO BY OTHER MEANS AND NOTED RX SENT TO THEM ON 6-27-18 FOR 3 PACKS WITH 3 REFILLS.

## 2018-08-09 ENCOUNTER — OFFICE VISIT (OUTPATIENT)
Dept: INTERNAL MEDICINE CLINIC | Facility: CLINIC | Age: 33
End: 2018-08-09
Payer: COMMERCIAL

## 2018-08-09 VITALS
SYSTOLIC BLOOD PRESSURE: 123 MMHG | BODY MASS INDEX: 42.45 KG/M2 | DIASTOLIC BLOOD PRESSURE: 79 MMHG | WEIGHT: 230.69 LBS | HEART RATE: 78 BPM | TEMPERATURE: 99 F | HEIGHT: 62 IN | OXYGEN SATURATION: 98 %

## 2018-08-09 DIAGNOSIS — R05.8 POST-VIRAL COUGH SYNDROME: Primary | ICD-10-CM

## 2018-08-09 DIAGNOSIS — G47.09 OTHER INSOMNIA: ICD-10-CM

## 2018-08-09 PROCEDURE — 99212 OFFICE O/P EST SF 10 MIN: CPT | Performed by: INTERNAL MEDICINE

## 2018-08-09 PROCEDURE — 99213 OFFICE O/P EST LOW 20 MIN: CPT | Performed by: INTERNAL MEDICINE

## 2018-08-09 RX ORDER — CODEINE PHOSPHATE AND GUAIFENESIN 10; 100 MG/5ML; MG/5ML
5 SOLUTION ORAL 2 TIMES DAILY PRN
Qty: 180 ML | Refills: 0 | Status: SHIPPED | OUTPATIENT
Start: 2018-08-09 | End: 2019-03-25 | Stop reason: ALTCHOICE

## 2018-08-09 NOTE — ASSESSMENT & PLAN NOTE
Advised sleep hygiene and conservative approaches. Avoid watching television or working in the bedroom. No pets in the bedroom. Avoid coffee after 4 PM.  Avoid nicotine. Avoid exercise with 2-3 hours of bedtime.   Keep bedroom quiet and conducive for sl

## 2018-08-09 NOTE — ASSESSMENT & PLAN NOTE
No active infection evident on physical exam.  Most likely post viral.  Giving Cheratussin cough syrup. If fails to improve the next 1 or 2 weeks, contact me.

## 2018-08-09 NOTE — PROGRESS NOTES
Cheyenne Paez is a 34year old female. Patient presents with:  Cough  Sleep Problem      HPI:     HPI  Patient is a 24-year-old female here with the complaints of cough 3 weeks.   She was feeling congested 3 weeks ago, congestion improved, cough pers Smokeless tobacco: Never Used                      Alcohol use: No               Comment: occ       REVIEW OF SYSTEMS:   Review of Systems   Constitutional: Negative for chills, fever, malaise/fatigue and weight loss.    HENT: Negative for congestion, e wheezes. Abdominal: Soft. Bowel sounds are normal.   Neurological: She is alert and oriented to person, place, and time. Skin: No rash noted.          ASSESSMENT AND PLAN:     Problem List Items Addressed This Visit        Respiratory    Post-viral coug

## 2019-03-21 ENCOUNTER — TELEPHONE (OUTPATIENT)
Dept: INTERNAL MEDICINE CLINIC | Facility: CLINIC | Age: 34
End: 2019-03-21

## 2019-03-21 DIAGNOSIS — Z00.00 ANNUAL PHYSICAL EXAM: Primary | ICD-10-CM

## 2019-03-21 NOTE — TELEPHONE ENCOUNTER
Pt called in requesting to have px lab orders placed on to her chart. Pt has an appt scheduled for 3/25.   Please advise

## 2019-03-24 ENCOUNTER — LAB ENCOUNTER (OUTPATIENT)
Dept: LAB | Facility: HOSPITAL | Age: 34
End: 2019-03-24
Attending: INTERNAL MEDICINE
Payer: COMMERCIAL

## 2019-03-24 DIAGNOSIS — Z00.00 ANNUAL PHYSICAL EXAM: ICD-10-CM

## 2019-03-24 LAB
ALBUMIN SERPL-MCNC: 3 G/DL (ref 3.4–5)
ALBUMIN/GLOB SERPL: 0.8 {RATIO} (ref 1–2)
ALP LIVER SERPL-CCNC: 32 U/L (ref 37–98)
ALT SERPL-CCNC: 20 U/L (ref 13–56)
ANION GAP SERPL CALC-SCNC: 6 MMOL/L (ref 0–18)
AST SERPL-CCNC: 13 U/L (ref 15–37)
BASOPHILS # BLD AUTO: 0.06 X10(3) UL (ref 0–0.2)
BASOPHILS NFR BLD AUTO: 0.8 %
BILIRUB SERPL-MCNC: 0.2 MG/DL (ref 0.1–2)
BUN BLD-MCNC: 7 MG/DL (ref 7–18)
BUN/CREAT SERPL: 9.7 (ref 10–20)
CALCIUM BLD-MCNC: 8.6 MG/DL (ref 8.5–10.1)
CHLORIDE SERPL-SCNC: 108 MMOL/L (ref 98–107)
CHOLEST SMN-MCNC: 171 MG/DL (ref ?–200)
CO2 SERPL-SCNC: 28 MMOL/L (ref 21–32)
CREAT BLD-MCNC: 0.72 MG/DL (ref 0.55–1.02)
DEPRECATED RDW RBC AUTO: 42.6 FL (ref 35.1–46.3)
EOSINOPHIL # BLD AUTO: 0.27 X10(3) UL (ref 0–0.7)
EOSINOPHIL NFR BLD AUTO: 3.7 %
ERYTHROCYTE [DISTWIDTH] IN BLOOD BY AUTOMATED COUNT: 14.7 % (ref 11–15)
GLOBULIN PLAS-MCNC: 4 G/DL (ref 2.8–4.4)
GLUCOSE BLD-MCNC: 91 MG/DL (ref 70–99)
HCT VFR BLD AUTO: 40.7 % (ref 35–48)
HDLC SERPL-MCNC: 69 MG/DL (ref 40–59)
HGB BLD-MCNC: 12.7 G/DL (ref 12–16)
IMM GRANULOCYTES # BLD AUTO: 0.02 X10(3) UL (ref 0–1)
IMM GRANULOCYTES NFR BLD: 0.3 %
LDLC SERPL CALC-MCNC: 81 MG/DL (ref ?–100)
LYMPHOCYTES # BLD AUTO: 2.56 X10(3) UL (ref 1–4)
LYMPHOCYTES NFR BLD AUTO: 35.5 %
M PROTEIN MFR SERPL ELPH: 7 G/DL (ref 6.4–8.2)
MCH RBC QN AUTO: 25 PG (ref 26–34)
MCHC RBC AUTO-ENTMCNC: 31.2 G/DL (ref 31–37)
MCV RBC AUTO: 80 FL (ref 80–100)
MONOCYTES # BLD AUTO: 0.64 X10(3) UL (ref 0.1–1)
MONOCYTES NFR BLD AUTO: 8.9 %
NEUTROPHILS # BLD AUTO: 3.66 X10 (3) UL (ref 1.5–7.7)
NEUTROPHILS # BLD AUTO: 3.66 X10(3) UL (ref 1.5–7.7)
NEUTROPHILS NFR BLD AUTO: 50.8 %
NONHDLC SERPL-MCNC: 102 MG/DL (ref ?–130)
OSMOLALITY SERPL CALC.SUM OF ELEC: 292 MOSM/KG (ref 275–295)
PLATELET # BLD AUTO: 344 10(3)UL (ref 150–450)
POTASSIUM SERPL-SCNC: 4.2 MMOL/L (ref 3.5–5.1)
RBC # BLD AUTO: 5.09 X10(6)UL (ref 3.8–5.3)
SODIUM SERPL-SCNC: 142 MMOL/L (ref 136–145)
TRIGL SERPL-MCNC: 105 MG/DL (ref 30–149)
TSI SER-ACNC: 0.47 MIU/ML (ref 0.36–3.74)
VLDLC SERPL CALC-MCNC: 21 MG/DL (ref 0–30)
WBC # BLD AUTO: 7.2 X10(3) UL (ref 4–11)

## 2019-03-24 PROCEDURE — 85025 COMPLETE CBC W/AUTO DIFF WBC: CPT

## 2019-03-24 PROCEDURE — 36415 COLL VENOUS BLD VENIPUNCTURE: CPT

## 2019-03-24 PROCEDURE — 80061 LIPID PANEL: CPT

## 2019-03-24 PROCEDURE — 80053 COMPREHEN METABOLIC PANEL: CPT

## 2019-03-24 PROCEDURE — 84443 ASSAY THYROID STIM HORMONE: CPT

## 2019-03-25 ENCOUNTER — OFFICE VISIT (OUTPATIENT)
Dept: INTERNAL MEDICINE CLINIC | Facility: CLINIC | Age: 34
End: 2019-03-25
Payer: COMMERCIAL

## 2019-03-25 VITALS
BODY MASS INDEX: 40.19 KG/M2 | HEIGHT: 62.5 IN | DIASTOLIC BLOOD PRESSURE: 85 MMHG | SYSTOLIC BLOOD PRESSURE: 130 MMHG | HEART RATE: 78 BPM | WEIGHT: 224 LBS

## 2019-03-25 DIAGNOSIS — Z00.00 ANNUAL PHYSICAL EXAM: Primary | ICD-10-CM

## 2019-03-25 DIAGNOSIS — N63.20 LEFT BREAST MASS: ICD-10-CM

## 2019-03-25 PROCEDURE — 99395 PREV VISIT EST AGE 18-39: CPT | Performed by: INTERNAL MEDICINE

## 2019-03-25 NOTE — PROGRESS NOTES
Carmelo Yen is a 28year old female. Patient presents with:  Physical      HPI:     HPI  Patient is here for physical.  Overall doing well. Trying to lose weight. Trying to watch low carb diet. Denies any complaints.   Has fibroid removal by Tania Neurological: Negative for dizziness and headaches. Endo/Heme/Allergies: Negative for environmental allergies. Psychiatric/Behavioral: Negative for depression. The patient is not nervous/anxious and does not have insomnia.           EXAM:   /85 (B Psychiatric: She has a normal mood and affect. ASSESSMENT AND PLAN:       Diagnoses and all orders for this visit:    Left breast mass  -     WESLEY FAINA 2D+3D DIAGNOSTIC [facility]  BILAT (CPT=77066/13126);  Future  Lump noted on the left outer upper and out

## 2019-06-17 ENCOUNTER — TELEPHONE (OUTPATIENT)
Dept: OBGYN CLINIC | Facility: CLINIC | Age: 34
End: 2019-06-17

## 2019-06-17 RX ORDER — NORGESTIMATE AND ETHINYL ESTRADIOL 7DAYSX3 28
1 KIT ORAL
Qty: 3 PACKAGE | Refills: 0 | Status: SHIPPED | OUTPATIENT
Start: 2019-06-17 | End: 2019-07-17

## 2019-06-17 RX ORDER — NORGESTIMATE AND ETHINYL ESTRADIOL 7DAYSX3 28
1 KIT ORAL
Qty: 3 PACKAGE | Refills: 3 | Status: CANCELLED | OUTPATIENT
Start: 2019-06-17

## 2019-06-17 NOTE — TELEPHONE ENCOUNTER
Pt requesting 2 OCP packs to cover her to next annual on 7/17. States she is out of pills and was supposed to start a new pack yesterday. Informed pt refill will be submitted today. Advised pt to take 2 pills today and use a back up for the entire pack.  fuentes

## 2019-07-17 ENCOUNTER — LAB ENCOUNTER (OUTPATIENT)
Dept: LAB | Facility: HOSPITAL | Age: 34
End: 2019-07-17
Attending: OBSTETRICS & GYNECOLOGY
Payer: COMMERCIAL

## 2019-07-17 ENCOUNTER — OFFICE VISIT (OUTPATIENT)
Dept: OBGYN CLINIC | Facility: CLINIC | Age: 34
End: 2019-07-17
Payer: COMMERCIAL

## 2019-07-17 VITALS
HEART RATE: 84 BPM | SYSTOLIC BLOOD PRESSURE: 122 MMHG | WEIGHT: 228 LBS | DIASTOLIC BLOOD PRESSURE: 86 MMHG | BODY MASS INDEX: 41 KG/M2

## 2019-07-17 DIAGNOSIS — Z01.419 ENCOUNTER FOR GYNECOLOGICAL EXAMINATION WITHOUT ABNORMAL FINDING: Primary | ICD-10-CM

## 2019-07-17 DIAGNOSIS — Z11.3 SCREENING EXAMINATION FOR VENEREAL DISEASE: ICD-10-CM

## 2019-07-17 LAB
HBV SURFACE AG SER-ACNC: 0.1 [IU]/L
HBV SURFACE AG SERPL QL IA: NONREACTIVE
HCV AB SERPL QL IA: NONREACTIVE

## 2019-07-17 PROCEDURE — 86803 HEPATITIS C AB TEST: CPT

## 2019-07-17 PROCEDURE — 86780 TREPONEMA PALLIDUM: CPT

## 2019-07-17 PROCEDURE — 99395 PREV VISIT EST AGE 18-39: CPT | Performed by: OBSTETRICS & GYNECOLOGY

## 2019-07-17 PROCEDURE — 87389 HIV-1 AG W/HIV-1&-2 AB AG IA: CPT

## 2019-07-17 PROCEDURE — 87340 HEPATITIS B SURFACE AG IA: CPT

## 2019-07-17 PROCEDURE — 36415 COLL VENOUS BLD VENIPUNCTURE: CPT

## 2019-07-17 RX ORDER — NORGESTIMATE AND ETHINYL ESTRADIOL 7DAYSX3 28
1 KIT ORAL
Qty: 3 PACKAGE | Refills: 3 | Status: SHIPPED | OUTPATIENT
Start: 2019-07-17 | End: 2019-09-18

## 2019-07-17 NOTE — PROGRESS NOTES
HPI:    Patient ID: Ankush Graham is a 28year old female. HPI  Nulligravida with regular menses on OCP for cycle control but did start new relationship in March. She saw Dr Mamie Anderson in march and mammo ordered for new lump left.  Per patient, lump res tenderness. There is no rebound and no guarding. Genitourinary: Vagina normal and uterus normal. No breast discharge. There is no rash or lesion on the right labia. There is no rash or lesion on the left labia. Uterus is not enlarged and not tender.  Cerv

## 2019-07-18 LAB
C TRACH DNA SPEC QL NAA+PROBE: NEGATIVE
HPV I/H RISK 1 DNA SPEC QL NAA+PROBE: POSITIVE
N GONORRHOEA DNA SPEC QL NAA+PROBE: NEGATIVE

## 2019-07-19 LAB
T PALLIDUM AB SER QL: NEGATIVE
T VAGINALIS RRNA SPEC QL NAA+PROBE: NEGATIVE

## 2019-07-23 LAB
HPV16 DNA CVX QL PROBE+SIG AMP: NEGATIVE
HPV18 DNA CVX QL PROBE+SIG AMP: NEGATIVE

## 2019-07-31 NOTE — PROGRESS NOTES
Vicente Latif. You have a normal pap with initial positive HPV test but negative for HPV sub-types 16, 18 and 45. With the current national  guidelines, you can repeat exam and pap in 1 year. The STD screen is negative. as well. See you then.

## 2019-08-19 ENCOUNTER — OFFICE VISIT (OUTPATIENT)
Dept: INTERNAL MEDICINE CLINIC | Facility: CLINIC | Age: 34
End: 2019-08-19
Payer: COMMERCIAL

## 2019-08-19 VITALS
HEIGHT: 62.5 IN | WEIGHT: 230.63 LBS | SYSTOLIC BLOOD PRESSURE: 130 MMHG | TEMPERATURE: 99 F | HEART RATE: 73 BPM | BODY MASS INDEX: 41.38 KG/M2 | DIASTOLIC BLOOD PRESSURE: 87 MMHG

## 2019-08-19 DIAGNOSIS — R05.3 CHRONIC COUGH: Primary | ICD-10-CM

## 2019-08-19 PROCEDURE — 99213 OFFICE O/P EST LOW 20 MIN: CPT | Performed by: INTERNAL MEDICINE

## 2019-08-19 NOTE — PROGRESS NOTES
Camille Saleh is a 28year old female. Patient presents with:  Cough    HPI:   Since February of this year, she has had a persistent intermittent nonproductive cough. On average, she coughs 3 days a week.   Episodes of coughing can sometimes be abbie standard drinks      Comment: rare    Drug use: No       EXAM:   GENERAL: Pleasant female appearing well and breathing comfortably in no distress  /87 (BP Location: Right arm, Patient Position: Sitting, Cuff Size: large)   Pulse 73   Temp 98.9 °F (37

## 2019-08-19 NOTE — PATIENT INSTRUCTIONS
Await results of chest x-ray. Continue Zyrtec 10 mg daily. If you wish we can do some breathing tests and try an inhaler for your wheezing.

## 2019-09-16 ENCOUNTER — TELEPHONE (OUTPATIENT)
Dept: OBGYN CLINIC | Facility: CLINIC | Age: 34
End: 2019-09-16

## 2019-09-16 NOTE — TELEPHONE ENCOUNTER
LEFT MESSAGE THAT SHE HAS REFILLS AVAILABLE AND CAN REQUEST AN EARLY REFILL BUT HER INSURANCE MAY NOT COVER SO SHE WILL HAVE TO PAY OUT OF POCKET.

## 2019-09-18 ENCOUNTER — OFFICE VISIT (OUTPATIENT)
Dept: INTERNAL MEDICINE CLINIC | Facility: CLINIC | Age: 34
End: 2019-09-18
Payer: COMMERCIAL

## 2019-09-18 VITALS
BODY MASS INDEX: 41.45 KG/M2 | WEIGHT: 231 LBS | SYSTOLIC BLOOD PRESSURE: 125 MMHG | HEIGHT: 62.5 IN | DIASTOLIC BLOOD PRESSURE: 81 MMHG | HEART RATE: 81 BPM

## 2019-09-18 DIAGNOSIS — T14.8XXA MUSCLE STRAIN: Primary | ICD-10-CM

## 2019-09-18 PROCEDURE — 99214 OFFICE O/P EST MOD 30 MIN: CPT | Performed by: NURSE PRACTITIONER

## 2019-09-18 RX ORDER — NORGESTIMATE AND ETHINYL ESTRADIOL 7DAYSX3 28
1 KIT ORAL
Qty: 3 PACKAGE | Refills: 3 | Status: SHIPPED | OUTPATIENT
Start: 2019-09-18 | End: 2020-12-22

## 2019-09-18 RX ORDER — IBUPROFEN 600 MG/1
600 TABLET ORAL EVERY 8 HOURS PRN
Qty: 60 TABLET | Refills: 5 | Status: SHIPPED | OUTPATIENT
Start: 2019-09-18 | End: 2020-11-14

## 2019-09-18 RX ORDER — CYCLOBENZAPRINE HCL 10 MG
10 TABLET ORAL NIGHTLY PRN
Qty: 30 TABLET | Refills: 1 | Status: SHIPPED | OUTPATIENT
Start: 2019-09-18 | End: 2019-09-27

## 2019-09-18 NOTE — PROGRESS NOTES
HPI:    Patient ID: Stephany Cochran is a 28year old female. LMP 9/6/2019  Patient on Birth control    HPI- Patient came to clinic for muscle strain. Sunday she was shopping.  She was in the grocery store and reached for a bottle of juice and she felt Physical Exam   Nursing note and vitals reviewed. Constitutional: She is oriented to person, place, and time. She appears well-developed and well-nourished. HENT:   Head: Normocephalic.    Right Ear: Tympanic membrane normal.   Left Ear: Tympanic memb not improve. A letter was generated for her to be off of work until Monday. Return if symptoms worsen or fail to improve. No orders of the defined types were placed in this encounter.       Meds This Visit:  Requested Prescriptions

## 2019-09-18 NOTE — PATIENT INSTRUCTIONS
Sciatica    Sciatica is a condition that causes pain in the lower back that spreads down into the buttock, hip, and leg. Sometimes the leg pain can happen without any back pain.  Sciatica happens when a spinal nerve is irritated or has pressure put on it toward your chest and a pillow between your knees. · Avoid sitting for long periods. This puts more stress on your lower back than standing or walking. · Use heat from a hot shower, hot bath, or heating pad to help ease pain. Massage can also help.  You c

## 2019-09-18 NOTE — ASSESSMENT & PLAN NOTE
A/P-28year-old here for muscle strain with sciatic pain. On Sunday she was at the grocery store reaching for a bottle of juice and felt a pull in her right lower back. It is 10 out of 10 and it is constant. Pain radiates down her right leg.   She has no

## 2019-09-23 ENCOUNTER — NURSE TRIAGE (OUTPATIENT)
Dept: OTHER | Age: 34
End: 2019-09-23

## 2019-09-23 NOTE — TELEPHONE ENCOUNTER
Patient seen in [facility] on 9/18/19 but is still getting spasms to her right buttocks that occur in her sleep and wakes her from her sleep. The pain is severe 10/10 and sometimes a \"12\".  She has done everything including icing, rest, aleve and [employer] but noth

## 2019-09-23 NOTE — TELEPHONE ENCOUNTER
Back muscle strain take time to get better.   If not improved by Thursday I can see her on Friday    Tiara Farmer, ANP

## 2019-09-26 NOTE — TELEPHONE ENCOUNTER
Patient called reports back pain is\" not really any better \"    Scheduled an appt for 9/27/19 at 7:30am with Cha Mcnamara @ [place] [id number]

## 2019-09-27 ENCOUNTER — TELEPHONE (OUTPATIENT)
Dept: INTERNAL MEDICINE CLINIC | Facility: CLINIC | Age: 34
End: 2019-09-27

## 2019-09-27 ENCOUNTER — OFFICE VISIT (OUTPATIENT)
Dept: INTERNAL MEDICINE CLINIC | Facility: CLINIC | Age: 34
End: 2019-09-27
Payer: COMMERCIAL

## 2019-09-27 ENCOUNTER — HOSPITAL ENCOUNTER (OUTPATIENT)
Dept: GENERAL RADIOLOGY | Facility: HOSPITAL | Age: 34
Discharge: HOME | End: 2019-09-27
Attending: NURSE PRACTITIONER
Payer: COMMERCIAL

## 2019-09-27 VITALS
HEART RATE: 96 BPM | WEIGHT: 228 LBS | BODY MASS INDEX: 40.91 KG/M2 | DIASTOLIC BLOOD PRESSURE: 80 MMHG | HEIGHT: 62.5 IN | SYSTOLIC BLOOD PRESSURE: 122 MMHG

## 2019-09-27 DIAGNOSIS — M54.41 ACUTE RIGHT-SIDED LOW BACK PAIN WITH RIGHT-SIDED SCIATICA: ICD-10-CM

## 2019-09-27 DIAGNOSIS — T14.8XXA MUSCLE STRAIN: ICD-10-CM

## 2019-09-27 DIAGNOSIS — M54.41 ACUTE RIGHT-SIDED LOW BACK PAIN WITH RIGHT-SIDED SCIATICA: Primary | ICD-10-CM

## 2019-09-27 PROCEDURE — 72110 X-RAY EXAM L-2 SPINE 4/>VWS: CPT | Performed by: NURSE PRACTITIONER

## 2019-09-27 PROCEDURE — 99214 OFFICE O/P EST MOD 30 MIN: CPT | Performed by: NURSE PRACTITIONER

## 2019-09-27 RX ORDER — CYCLOBENZAPRINE HCL 10 MG
10 TABLET ORAL 2 TIMES DAILY PRN
Qty: 30 TABLET | Refills: 1 | Status: SHIPPED | OUTPATIENT
Start: 2019-09-27 | End: 2019-10-27

## 2019-09-27 RX ORDER — PREDNISONE 10 MG/1
TABLET ORAL
Qty: 30 TABLET | Refills: 0 | Status: SHIPPED | OUTPATIENT
Start: 2019-09-27 | End: 2020-09-17 | Stop reason: ALTCHOICE

## 2019-09-27 NOTE — ASSESSMENT & PLAN NOTE
A/P 50-year-old female here for continued pain of her right lower back. She initially was at a grocery store reaching for an item when she felt a pull in her right lower back.   She was given Flexeril and taking ibuprofen every 8 hours, and icing back  3 t

## 2019-09-27 NOTE — PROGRESS NOTES
HPI:    Patient ID: Getachew Lopez is a 28year old female. HPI 28year old female returned for continued pain in her right lower back. She describes the pain as a spasm. She states the Flexeril helps for a few hours then she wakes up with pain.  In HIVES  Other                   HIVES    Comment:Computer toner powder  Titanium                SWELLING   PHYSICAL EXAM:   Physical Exam   Nursing note and vitals reviewed. Constitutional: She is oriented to person, place, and time.  She appears well-deve spine  - IF no relief schedule appointment with physiatry. Dr. Klever Nuno  - Referal given for physical therapy.   -           Other Visit Diagnoses     Acute right-sided low back pain with right-sided sciatica    -  Primary    Relevant Orders    XR LUMBAR SPI

## 2019-09-27 NOTE — TELEPHONE ENCOUNTER
Elisabethgianluca Ny    Patient left a message on [employer] to call her. Returned patient call and all questions answered about her X-ray.     Continue with current plan from today's OV

## 2020-02-27 ENCOUNTER — OFFICE VISIT (OUTPATIENT)
Dept: OBGYN CLINIC | Facility: CLINIC | Age: 35
End: 2020-02-27
Payer: COMMERCIAL

## 2020-02-27 ENCOUNTER — LAB ENCOUNTER (OUTPATIENT)
Dept: LAB | Facility: HOSPITAL | Age: 35
End: 2020-02-27
Attending: CLINICAL NURSE SPECIALIST
Payer: COMMERCIAL

## 2020-02-27 VITALS
DIASTOLIC BLOOD PRESSURE: 83 MMHG | HEART RATE: 78 BPM | SYSTOLIC BLOOD PRESSURE: 131 MMHG | WEIGHT: 227.38 LBS | BODY MASS INDEX: 41 KG/M2

## 2020-02-27 DIAGNOSIS — Z11.3 SCREENING FOR STD (SEXUALLY TRANSMITTED DISEASE): Primary | ICD-10-CM

## 2020-02-27 DIAGNOSIS — Z11.3 SCREENING FOR STD (SEXUALLY TRANSMITTED DISEASE): ICD-10-CM

## 2020-02-27 LAB
HBV SURFACE AG SER-ACNC: <0.1 [IU]/L
HBV SURFACE AG SERPL QL IA: NONREACTIVE
HCV AB SERPL QL IA: NONREACTIVE

## 2020-02-27 PROCEDURE — 87389 HIV-1 AG W/HIV-1&-2 AB AG IA: CPT

## 2020-02-27 PROCEDURE — 87340 HEPATITIS B SURFACE AG IA: CPT

## 2020-02-27 PROCEDURE — 86780 TREPONEMA PALLIDUM: CPT

## 2020-02-27 PROCEDURE — 86803 HEPATITIS C AB TEST: CPT

## 2020-02-27 PROCEDURE — 36415 COLL VENOUS BLD VENIPUNCTURE: CPT

## 2020-02-27 PROCEDURE — 99213 OFFICE O/P EST LOW 20 MIN: CPT | Performed by: CLINICAL NURSE SPECIALIST

## 2020-02-27 RX ORDER — CYCLOBENZAPRINE HCL 10 MG
TABLET ORAL
COMMUNITY
Start: 2019-12-16 | End: 2021-01-18

## 2020-02-28 LAB
C TRACH DNA SPEC QL NAA+PROBE: NEGATIVE
N GONORRHOEA DNA SPEC QL NAA+PROBE: NEGATIVE
T PALLIDUM AB SER QL: NEGATIVE

## 2020-03-01 LAB
GENITAL VAGINOSIS SCREEN: POSITIVE
TRICHOMONAS SCREEN: NEGATIVE

## 2020-03-02 ENCOUNTER — TELEPHONE (OUTPATIENT)
Dept: OBGYN CLINIC | Facility: CLINIC | Age: 35
End: 2020-03-02

## 2020-03-02 RX ORDER — METRONIDAZOLE 7.5 MG/G
1 GEL VAGINAL NIGHTLY
Qty: 1 TUBE | Refills: 0 | Status: SHIPPED | OUTPATIENT
Start: 2020-03-02 | End: 2020-03-07

## 2020-03-02 NOTE — TELEPHONE ENCOUNTER
----- Message from ALANA Marshall sent at 3/2/2020 12:59 PM CST -----  Please let pt know STD testing is all negative.  Vaginal culture is + for BV so please send Rx for Metrogel (last time she used Flagyl she broke out in hives so needs vaginal treatm

## 2020-03-02 NOTE — PROGRESS NOTES
Jose Daniel Gregory is a 27year old female [facility] Patient's last menstrual period was 02/20/2020. Patient presents with:  Gyn Problem: STD testing  Recently broke up with her boyfriend and would like STD testing to be safe.  The last time she was treated partner: Not on file        Emotionally abused: Not on file        Physically abused: Not on file        Forced sexual activity: Not on file    Other Topics      Concerns:         Service: Not Asked        Blood Transfusions: Not Asked        Caffe tenderness  Vagina:  Normal appearance without lesions, no abnormal discharge  Cervix:  Normal  Perineum: normal  Anus: no hemorroids   Lymph node: no inguinal lymph nodes    Assessment & Plan:  Jude Gomez was seen today for gyn problem.     Diagnoses and all

## 2020-09-16 ENCOUNTER — OFFICE VISIT (OUTPATIENT)
Dept: OBGYN CLINIC | Facility: CLINIC | Age: 35
End: 2020-09-16
Payer: COMMERCIAL

## 2020-09-16 VITALS
WEIGHT: 235.38 LBS | HEART RATE: 87 BPM | DIASTOLIC BLOOD PRESSURE: 81 MMHG | SYSTOLIC BLOOD PRESSURE: 135 MMHG | BODY MASS INDEX: 42 KG/M2

## 2020-09-16 DIAGNOSIS — Z01.419 ENCOUNTER FOR GYNECOLOGICAL EXAMINATION WITHOUT ABNORMAL FINDING: Primary | ICD-10-CM

## 2020-09-16 DIAGNOSIS — Z11.3 SCREENING EXAMINATION FOR VENEREAL DISEASE: ICD-10-CM

## 2020-09-16 DIAGNOSIS — Z12.4 SCREENING FOR MALIGNANT NEOPLASM OF CERVIX: ICD-10-CM

## 2020-09-16 PROCEDURE — 3079F DIAST BP 80-89 MM HG: CPT | Performed by: OBSTETRICS & GYNECOLOGY

## 2020-09-16 PROCEDURE — 3075F SYST BP GE 130 - 139MM HG: CPT | Performed by: OBSTETRICS & GYNECOLOGY

## 2020-09-16 PROCEDURE — 99395 PREV VISIT EST AGE 18-39: CPT | Performed by: OBSTETRICS & GYNECOLOGY

## 2020-09-17 ENCOUNTER — OFFICE VISIT (OUTPATIENT)
Dept: INTERNAL MEDICINE CLINIC | Facility: CLINIC | Age: 35
End: 2020-09-17
Payer: COMMERCIAL

## 2020-09-17 VITALS
HEART RATE: 81 BPM | WEIGHT: 236.38 LBS | BODY MASS INDEX: 42.41 KG/M2 | SYSTOLIC BLOOD PRESSURE: 127 MMHG | HEIGHT: 62.5 IN | DIASTOLIC BLOOD PRESSURE: 83 MMHG

## 2020-09-17 DIAGNOSIS — Z00.00 ANNUAL PHYSICAL EXAM: Primary | ICD-10-CM

## 2020-09-17 DIAGNOSIS — T14.8XXA MUSCLE STRAIN: ICD-10-CM

## 2020-09-17 LAB
C TRACH DNA SPEC QL NAA+PROBE: NEGATIVE
HPV I/H RISK 1 DNA SPEC QL NAA+PROBE: NEGATIVE
N GONORRHOEA DNA SPEC QL NAA+PROBE: NEGATIVE

## 2020-09-17 PROCEDURE — 3074F SYST BP LT 130 MM HG: CPT | Performed by: NURSE PRACTITIONER

## 2020-09-17 PROCEDURE — 3079F DIAST BP 80-89 MM HG: CPT | Performed by: NURSE PRACTITIONER

## 2020-09-17 PROCEDURE — 99395 PREV VISIT EST AGE 18-39: CPT | Performed by: NURSE PRACTITIONER

## 2020-09-17 PROCEDURE — 3008F BODY MASS INDEX DOCD: CPT | Performed by: NURSE PRACTITIONER

## 2020-09-17 RX ORDER — ALBUTEROL SULFATE 90 UG/1
2 AEROSOL, METERED RESPIRATORY (INHALATION) EVERY 6 HOURS PRN
Qty: 1 INHALER | Refills: 1 | Status: SHIPPED | OUTPATIENT
Start: 2020-09-17

## 2020-09-17 NOTE — ASSESSMENT & PLAN NOTE
A/P patient states from time to time when weather changes she feels a stiffness in her lower back and she uses Flexeril which works for her.

## 2020-09-17 NOTE — PROGRESS NOTES
HPI:    Patient ID: Lou Lewis is a 27year old female.     Immunization History  Administered            Date(s) Administered    None  Pended                  Date(s) Pended    Influenza Vaccine Refused                          09/17/2020    LMP S shortness of breath. Cardiovascular: Negative for chest pain, palpitations and leg swelling. Gastrointestinal: Negative for nausea, vomiting, abdominal pain, diarrhea and constipation. Endocrine: Negative for cold intolerance and heat intolerance. mass. There is no tenderness. There is no guarding. Musculoskeletal: She exhibits no tenderness. Lymphadenopathy:     She has no cervical adenopathy. Neurological: She is alert and oriented to person, place, and time. Skin: Skin is warm and dry.  No r plant protein diet to follow. No follow-ups on file.     Orders Placed This Encounter      CBC With Differential With Platelet      Comp Metabolic Panel (14)      Lipid Panel      Urinalysis, Routine      TSH reflex      HIV AG AB Combo [E]

## 2020-09-17 NOTE — ASSESSMENT & PLAN NOTE
A/P 26-year-old old -American female here for physical exam I saw her last year. She had her well woman exam with Dr. Tutu Perla yesterday.   She is not sexually active but wants STD testing and forgot that she did not get an HIV test yesterday and is r

## 2020-09-18 LAB — T VAGINALIS RRNA SPEC QL NAA+PROBE: NEGATIVE

## 2020-09-18 NOTE — PROGRESS NOTES
HPI:    Patient ID: Bronwen Dandy is a 27year old female. HPI Nulligravida with regular menses on OCP for menorrhagia. She is not in a relationship.      Review of Systems   Constitutional: Negative for appetite change, fatigue and unexpected weig Abdominal: Soft. She exhibits no distension and no mass. There is no tenderness. There is no rebound and no guarding. Genitourinary:    Vagina normal.   No breast discharge. There is no rash or lesion on the right labia.  There is no rash or lesion on t

## 2020-09-22 ENCOUNTER — LAB ENCOUNTER (OUTPATIENT)
Dept: LAB | Facility: HOSPITAL | Age: 35
End: 2020-09-22
Attending: NURSE PRACTITIONER
Payer: COMMERCIAL

## 2020-09-22 ENCOUNTER — PATIENT MESSAGE (OUTPATIENT)
Dept: OBGYN CLINIC | Facility: CLINIC | Age: 35
End: 2020-09-22

## 2020-09-22 DIAGNOSIS — Z00.00 ANNUAL PHYSICAL EXAM: ICD-10-CM

## 2020-09-22 LAB
ALBUMIN SERPL-MCNC: 3 G/DL (ref 3.4–5)
ALBUMIN/GLOB SERPL: 0.7 {RATIO} (ref 1–2)
ALP LIVER SERPL-CCNC: 38 U/L
ALT SERPL-CCNC: 25 U/L
ANION GAP SERPL CALC-SCNC: 7 MMOL/L (ref 0–18)
AST SERPL-CCNC: 18 U/L (ref 15–37)
BACTERIA UR QL AUTO: NEGATIVE /HPF
BASOPHILS # BLD AUTO: 0.07 X10(3) UL (ref 0–0.2)
BASOPHILS NFR BLD AUTO: 0.6 %
BILIRUB SERPL-MCNC: 0.3 MG/DL (ref 0.1–2)
BILIRUB UR QL: NEGATIVE
BUN BLD-MCNC: 10 MG/DL (ref 7–18)
BUN/CREAT SERPL: 11.4 (ref 10–20)
CALCIUM BLD-MCNC: 8.9 MG/DL (ref 8.5–10.1)
CHLORIDE SERPL-SCNC: 105 MMOL/L (ref 98–112)
CHOLEST SMN-MCNC: 162 MG/DL (ref ?–200)
CO2 SERPL-SCNC: 25 MMOL/L (ref 21–32)
COLOR UR: YELLOW
CREAT BLD-MCNC: 0.88 MG/DL
DEPRECATED RDW RBC AUTO: 42.5 FL (ref 35.1–46.3)
EOSINOPHIL # BLD AUTO: 0.37 X10(3) UL (ref 0–0.7)
EOSINOPHIL NFR BLD AUTO: 3.2 %
ERYTHROCYTE [DISTWIDTH] IN BLOOD BY AUTOMATED COUNT: 14.8 % (ref 11–15)
GLOBULIN PLAS-MCNC: 4.2 G/DL (ref 2.8–4.4)
GLUCOSE BLD-MCNC: 83 MG/DL (ref 70–99)
GLUCOSE UR-MCNC: NEGATIVE MG/DL
HCT VFR BLD AUTO: 40.9 %
HDLC SERPL-MCNC: 81 MG/DL (ref 40–59)
HGB BLD-MCNC: 13.3 G/DL
HGB UR QL STRIP.AUTO: NEGATIVE
HYALINE CASTS #/AREA URNS AUTO: 1 /LPF
IMM GRANULOCYTES # BLD AUTO: 0.04 X10(3) UL (ref 0–1)
IMM GRANULOCYTES NFR BLD: 0.3 %
KETONES UR-MCNC: 80 MG/DL
LDLC SERPL CALC-MCNC: 59 MG/DL (ref ?–100)
LEUKOCYTE ESTERASE UR QL STRIP.AUTO: NEGATIVE
LYMPHOCYTES # BLD AUTO: 2.11 X10(3) UL (ref 1–4)
LYMPHOCYTES NFR BLD AUTO: 18 %
M PROTEIN MFR SERPL ELPH: 7.2 G/DL (ref 6.4–8.2)
MCH RBC QN AUTO: 25.7 PG (ref 26–34)
MCHC RBC AUTO-ENTMCNC: 32.5 G/DL (ref 31–37)
MCV RBC AUTO: 79.1 FL
MONOCYTES # BLD AUTO: 1.19 X10(3) UL (ref 0.1–1)
MONOCYTES NFR BLD AUTO: 10.2 %
NEUTROPHILS # BLD AUTO: 7.93 X10 (3) UL (ref 1.5–7.7)
NEUTROPHILS # BLD AUTO: 7.93 X10(3) UL (ref 1.5–7.7)
NEUTROPHILS NFR BLD AUTO: 67.7 %
NITRITE UR QL STRIP.AUTO: NEGATIVE
NONHDLC SERPL-MCNC: 81 MG/DL (ref ?–130)
OSMOLALITY SERPL CALC.SUM OF ELEC: 282 MOSM/KG (ref 275–295)
PATIENT FASTING Y/N/NP: YES
PATIENT FASTING Y/N/NP: YES
PH UR: 5 [PH] (ref 5–8)
PLATELET # BLD AUTO: 300 10(3)UL (ref 150–450)
POTASSIUM SERPL-SCNC: 3.9 MMOL/L (ref 3.5–5.1)
PROT UR-MCNC: 30 MG/DL
RBC # BLD AUTO: 5.17 X10(6)UL
RBC #/AREA URNS AUTO: 1 /HPF
SODIUM SERPL-SCNC: 137 MMOL/L (ref 136–145)
SP GR UR STRIP: 1.03 (ref 1–1.03)
T3FREE SERPL-MCNC: 2.11 PG/ML (ref 2.4–4.2)
T4 FREE SERPL-MCNC: 1 NG/DL (ref 0.8–1.7)
TRIGL SERPL-MCNC: 111 MG/DL (ref 30–149)
TSI SER-ACNC: 0.35 MIU/ML (ref 0.36–3.74)
UROBILINOGEN UR STRIP-ACNC: <2
VLDLC SERPL CALC-MCNC: 22 MG/DL (ref 0–30)
WBC # BLD AUTO: 11.7 X10(3) UL (ref 4–11)
WBC #/AREA URNS AUTO: 1 /HPF

## 2020-09-22 PROCEDURE — 84481 FREE ASSAY (FT-3): CPT

## 2020-09-22 PROCEDURE — 81001 URINALYSIS AUTO W/SCOPE: CPT

## 2020-09-22 PROCEDURE — 87389 HIV-1 AG W/HIV-1&-2 AB AG IA: CPT

## 2020-09-22 PROCEDURE — 80061 LIPID PANEL: CPT

## 2020-09-22 PROCEDURE — 80053 COMPREHEN METABOLIC PANEL: CPT

## 2020-09-22 PROCEDURE — 84439 ASSAY OF FREE THYROXINE: CPT

## 2020-09-22 PROCEDURE — 84443 ASSAY THYROID STIM HORMONE: CPT

## 2020-09-22 PROCEDURE — 36415 COLL VENOUS BLD VENIPUNCTURE: CPT

## 2020-09-22 PROCEDURE — 85025 COMPLETE CBC W/AUTO DIFF WBC: CPT

## 2020-09-22 RX ORDER — METRONIDAZOLE 7.5 MG/G
1 GEL VAGINAL NIGHTLY
Qty: 1 TUBE | Refills: 0 | Status: SHIPPED | OUTPATIENT
Start: 2020-09-22 | End: 2020-09-27

## 2020-09-22 NOTE — TELEPHONE ENCOUNTER
Per results notes from 9/16/20 labs, PAULINO was going to send in rx for metrogel. When trying to send in rx, there is an alert for allergies. Pt has allergy to metronidazole.   Rx pended and sent to [facility] for approval.

## 2020-09-22 NOTE — PROGRESS NOTES
Vicente Latif. You have negative pap and HPV test. The pap suggests a bacterial vaginal infection. I see you were treated for the same infection in March. I will send the prescription for MetroGel vaginal to your pharmacy.   Continue yearly exam and do pap in

## 2020-09-22 NOTE — TELEPHONE ENCOUNTER
PT NOTIFIED PAULINO CHANGED RX TO METROGEL DUE TO ALLERGY TO FLAGYL AND WAS SENT TO HER PHARMACY THIS AFTERNOON. PT VERBALIZED UNDERSTANDING.

## 2020-09-22 NOTE — TELEPHONE ENCOUNTER
From: Landa Boeck  To: Wilber Krueger DO  Sent: 9/22/2020 1:18 PM CDT  Subject: Prescription Question    Dana Krueger,     Just following up if you have had a chance to send prescription request [facility] to [facility]? I've called and they have no new

## 2020-09-25 ENCOUNTER — TELEPHONE (OUTPATIENT)
Dept: INTERNAL MEDICINE CLINIC | Facility: CLINIC | Age: 35
End: 2020-09-25

## 2020-09-25 ENCOUNTER — PATIENT MESSAGE (OUTPATIENT)
Dept: INTERNAL MEDICINE CLINIC | Facility: CLINIC | Age: 35
End: 2020-09-25

## 2020-09-25 NOTE — TELEPHONE ENCOUNTER
From: Gonzalez Robles  To: ALANA Morillo  Sent: 9/25/2020 8:43 AM CDT  Subject: Prescription Question    Good Morning Anabel Mauricio,    Since taking the inhaler that was prescribed I've developed a cough and OTC medicine has not been helping.  Will it be po

## 2020-09-25 NOTE — TELEPHONE ENCOUNTER
----- Message from Jose Bonilla sent at 9/25/2020  8:43 AM CDT -----  Regarding: Prescription Question  Contact: [phone]  Good Morning Anusha Etienne,    Since taking the inhaler that was prescribed I've developed a cough and OTC medicine has not been

## 2020-09-26 ENCOUNTER — TELEPHONE (OUTPATIENT)
Dept: INTERNAL MEDICINE CLINIC | Facility: CLINIC | Age: 35
End: 2020-09-26

## 2020-09-26 RX ORDER — BENZONATATE 100 MG/1
100 CAPSULE ORAL 3 TIMES DAILY PRN
Qty: 30 CAPSULE | Refills: 1 | Status: SHIPPED | OUTPATIENT
Start: 2020-09-26

## 2020-09-26 NOTE — TELEPHONE ENCOUNTER
Action Requested: Summary for Provider     []  Critical Lab, Recommendations Needed  [] Need Additional Advice  []   FYI    []   Need Orders  [] Need Medications Sent to Pharmacy  []  Other     SUMMARY:   Dr Mc Case on call for TREVOR Banks  Patient calling b

## 2020-09-26 NOTE — TELEPHONE ENCOUNTER
Unable to commplete the page for Dr José Luis Collins, called and left message on his cell phone [phone]

## 2020-09-26 NOTE — TELEPHONE ENCOUNTER
Patient called the persistent cough despite Robitussin and Sine-Aid and other over-the-counter medicines. We will use Tessalon Perles as were not able to give Cheratussin on call.   She will follow-up with Dr. Jacqueline Garcia next week

## 2020-10-20 ENCOUNTER — OFFICE VISIT (OUTPATIENT)
Dept: ENDOCRINOLOGY CLINIC | Facility: CLINIC | Age: 35
End: 2020-10-20
Payer: COMMERCIAL

## 2020-10-20 VITALS
DIASTOLIC BLOOD PRESSURE: 86 MMHG | SYSTOLIC BLOOD PRESSURE: 132 MMHG | WEIGHT: 237 LBS | HEART RATE: 79 BPM | BODY MASS INDEX: 43 KG/M2

## 2020-10-20 DIAGNOSIS — E01.0 THYROMEGALY: Primary | ICD-10-CM

## 2020-10-20 DIAGNOSIS — R94.6 ABNORMAL THYROID FUNCTION TEST: ICD-10-CM

## 2020-10-20 PROCEDURE — 3075F SYST BP GE 130 - 139MM HG: CPT | Performed by: INTERNAL MEDICINE

## 2020-10-20 PROCEDURE — 99203 OFFICE O/P NEW LOW 30 MIN: CPT | Performed by: INTERNAL MEDICINE

## 2020-10-20 PROCEDURE — 3079F DIAST BP 80-89 MM HG: CPT | Performed by: INTERNAL MEDICINE

## 2020-10-20 NOTE — H&P
Name: Raquel Urban  Date: 10/20/2020    Referring Physician: No ref. provider found    Patient presents with:  Consult: Pt was advised to f/u with Endocrinologist due to abnormal thyroid labs.        HISTORY OF PRESENT ILLNESS   Raquel Urban i History   Problem Relation Age of Onset   • Prostate Cancer Other    • Diabetes Other    • Hypertension Other        Social History:   Social History    Tobacco Use      Smoking status: Never Smoker      Smokeless tobacco: Never Used    Alcohol use:  Yes hearing, normal speech and mildly enlarged thyroid gland  Neck: Trachea midline  Neurologic: sensory grossly intact and motor grossly intact  Respiratory:  clear to auscultation bilaterally  Cardiovascular:  regular rate, rhythm, , no murmurs, S3 or S4  [place]

## 2020-11-14 RX ORDER — IBUPROFEN 600 MG/1
TABLET ORAL
Qty: 60 TABLET | Refills: 5 | Status: SHIPPED | OUTPATIENT
Start: 2020-11-14

## 2020-11-14 RX ORDER — IBUPROFEN 600 MG/1
600 TABLET ORAL EVERY 8 HOURS PRN
Qty: 60 TABLET | Refills: 5 | Status: CANCELLED | OUTPATIENT
Start: 2020-11-14

## 2020-12-22 RX ORDER — NORGESTIMATE AND ETHINYL ESTRADIOL 7DAYSX3 28
KIT ORAL
Qty: 84 TABLET | Refills: 3 | Status: SHIPPED | OUTPATIENT
Start: 2020-12-22 | End: 2021-09-15

## 2021-01-18 RX ORDER — CYCLOBENZAPRINE HCL 10 MG
TABLET ORAL
Qty: 30 TABLET | Refills: 1 | Status: SHIPPED | OUTPATIENT
Start: 2021-01-18 | End: 2021-05-27

## 2021-04-05 ENCOUNTER — IMMUNIZATION (OUTPATIENT)
Dept: LAB | Age: 36
End: 2021-04-05

## 2021-04-05 DIAGNOSIS — Z23 NEED FOR VACCINATION: Primary | ICD-10-CM

## 2021-04-05 PROCEDURE — 0001A COVID 19 PFIZER-BIONTECH: CPT | Performed by: HOSPITALIST

## 2021-04-05 PROCEDURE — 91300 COVID 19 PFIZER-BIONTECH: CPT | Performed by: HOSPITALIST

## 2021-04-26 ENCOUNTER — IMMUNIZATION (OUTPATIENT)
Dept: LAB | Age: 36
End: 2021-04-26
Attending: HOSPITALIST

## 2021-04-26 DIAGNOSIS — Z23 NEED FOR VACCINATION: Primary | ICD-10-CM

## 2021-04-26 PROCEDURE — 0002A COVID 19 PFIZER-BIONTECH: CPT

## 2021-04-26 PROCEDURE — 91300 COVID 19 PFIZER-BIONTECH: CPT

## 2021-05-26 ENCOUNTER — TELEPHONE (OUTPATIENT)
Dept: INTERNAL MEDICINE CLINIC | Facility: CLINIC | Age: 36
End: 2021-05-26

## 2021-05-26 NOTE — TELEPHONE ENCOUNTER
Spoke with patient ( verified) and relayed message below--patient verbalizes understanding and agreement. Since visit changed to video--patient requesting earlier appt.        Future Appointments   Date Time Provider Yogi Blair   2021 10:30 A

## 2021-05-27 ENCOUNTER — TELEMEDICINE (OUTPATIENT)
Dept: INTERNAL MEDICINE CLINIC | Facility: CLINIC | Age: 36
End: 2021-05-27

## 2021-05-27 DIAGNOSIS — R05.9 COUGH: Primary | ICD-10-CM

## 2021-05-27 PROCEDURE — 99214 OFFICE O/P EST MOD 30 MIN: CPT | Performed by: NURSE PRACTITIONER

## 2021-05-27 RX ORDER — BENZONATATE 100 MG/1
100 CAPSULE ORAL 3 TIMES DAILY PRN
Qty: 20 CAPSULE | Refills: 0 | Status: SHIPPED | OUTPATIENT
Start: 2021-05-27 | End: 2021-06-03

## 2021-05-27 RX ORDER — CETIRIZINE HYDROCHLORIDE 10 MG/1
1 CAPSULE, LIQUID FILLED ORAL DAILY
Qty: 30 CAPSULE | Refills: 0 | Status: SHIPPED | OUTPATIENT
Start: 2021-05-27 | End: 2021-06-26

## 2021-05-27 RX ORDER — ALBUTEROL SULFATE 90 UG/1
2 AEROSOL, METERED RESPIRATORY (INHALATION) EVERY 6 HOURS PRN
Qty: 1 EACH | Refills: 0 | Status: SHIPPED | OUTPATIENT
Start: 2021-05-27 | End: 2021-12-16

## 2021-05-27 RX ORDER — CYCLOBENZAPRINE HCL 10 MG
10 TABLET ORAL AS NEEDED
Qty: 30 TABLET | Refills: 1 | Status: SHIPPED | OUTPATIENT
Start: 2021-05-27

## 2021-05-27 NOTE — ASSESSMENT & PLAN NOTE
A/P patient has chronic back pain from time to time and Flexeril relieves the pain. Since she is on the video visit she is wondering if she could have a prescription available in case her back pain acts up.     Plan  1) Cyclobenzaprine 10 mg p.o. 3 times d

## 2021-05-27 NOTE — ASSESSMENT & PLAN NOTE
A/P 25-year-old -American female who works from home and has developed a cough after having postnasal drip. Patient has had the symptoms in the past and benzonatate cough Perldeepak has helped and she is requesting a prescription for these.   She starte

## 2021-05-27 NOTE — PROGRESS NOTES
HPI:    Patient ID: Rosetta Cruz is a 38year old female. Please note that the following visit was completed using two-way, real-time interactive audio and video communication.   This has been done in good min to provide continuity of care in the wheezing. 4) Zyrtec 10 mg p.o. nightly    Muscle strain  A/P patient has chronic back pain from time to time and Flexeril relieves the pain.   Since she is on the video visit she is wondering if she could have a prescription available in case her back pain Genitourinary: Negative for dysuria and hematuria. Musculoskeletal: Negative for back pain and joint swelling. Skin: Negative for rash. Allergic/Immunologic: Negative for environmental allergies.    Neurological: Negative for weakness, numbness and General: Normal range of motion. Skin:     General: Skin is dry. Neurological:      Mental Status: She is alert and oriented to person, place, and time.    Psychiatric:         Mood and Affect: Mood normal.         Behavior: Behavior normal. as needed for cough.        Imaging & Referrals:  None         Merlin Pereyra, APRN

## 2021-09-15 ENCOUNTER — OFFICE VISIT (OUTPATIENT)
Dept: OBGYN CLINIC | Facility: CLINIC | Age: 36
End: 2021-09-15
Payer: COMMERCIAL

## 2021-09-15 VITALS
BODY MASS INDEX: 44 KG/M2 | DIASTOLIC BLOOD PRESSURE: 69 MMHG | HEART RATE: 76 BPM | WEIGHT: 247 LBS | SYSTOLIC BLOOD PRESSURE: 100 MMHG

## 2021-09-15 DIAGNOSIS — N91.2 AMENORRHEA: ICD-10-CM

## 2021-09-15 DIAGNOSIS — Z01.411 ENCOUNTER FOR GYNECOLOGICAL EXAMINATION WITH ABNORMAL FINDING: Primary | ICD-10-CM

## 2021-09-15 PROCEDURE — 3074F SYST BP LT 130 MM HG: CPT | Performed by: OBSTETRICS & GYNECOLOGY

## 2021-09-15 PROCEDURE — 3078F DIAST BP <80 MM HG: CPT | Performed by: OBSTETRICS & GYNECOLOGY

## 2021-09-15 PROCEDURE — 99395 PREV VISIT EST AGE 18-39: CPT | Performed by: OBSTETRICS & GYNECOLOGY

## 2021-09-15 RX ORDER — MEDROXYPROGESTERONE ACETATE 10 MG/1
10 TABLET ORAL DAILY
Qty: 7 TABLET | Refills: 3 | Status: SHIPPED | OUTPATIENT
Start: 2021-09-15

## 2021-09-23 PROBLEM — N91.2 AMENORRHEA: Status: ACTIVE | Noted: 2021-09-23

## 2021-09-23 NOTE — PROGRESS NOTES
Subjective:   Patient ID: Jeffrey Zurita is a 38year old female. HPI  Nulligravida with irregular menses and none since late May. Abstinent. OCP stopped in 2020 and regular until May.  Sadly, her MGM   at age 65 from pneumo HIVES  Other                   HIVES    Comment:Computer toner powder  Titanium                SWELLING    Objective:   Physical Exam  Constitutional:       General: She is not in acute distress. Appearance: She is well-developed.  She is not diaphoreti Referrals:  None

## 2021-12-16 ENCOUNTER — OFFICE VISIT (OUTPATIENT)
Dept: INTERNAL MEDICINE CLINIC | Facility: CLINIC | Age: 36
End: 2021-12-16
Payer: COMMERCIAL

## 2021-12-16 VITALS
SYSTOLIC BLOOD PRESSURE: 134 MMHG | WEIGHT: 244.88 LBS | BODY MASS INDEX: 43.94 KG/M2 | HEART RATE: 78 BPM | DIASTOLIC BLOOD PRESSURE: 84 MMHG | HEIGHT: 62.5 IN

## 2021-12-16 DIAGNOSIS — Z00.00 ANNUAL PHYSICAL EXAM: Primary | ICD-10-CM

## 2021-12-16 PROCEDURE — 99395 PREV VISIT EST AGE 18-39: CPT | Performed by: NURSE PRACTITIONER

## 2021-12-16 PROCEDURE — 3079F DIAST BP 80-89 MM HG: CPT | Performed by: NURSE PRACTITIONER

## 2021-12-16 PROCEDURE — 3008F BODY MASS INDEX DOCD: CPT | Performed by: NURSE PRACTITIONER

## 2021-12-16 PROCEDURE — 3075F SYST BP GE 130 - 139MM HG: CPT | Performed by: NURSE PRACTITIONER

## 2021-12-17 NOTE — PROGRESS NOTES
HPI:    Patient ID: Rosetta Cruz is a 39year old female. No Children  Works for [employer].   Gyne with Dr. Sj Rodriges    Immunization History  Administered            Date(s) Administered    Covid-19 Vaccine Pfizer 30 mcg/0.3 ml shortness of breath. Cardiovascular: Negative for chest pain, palpitations and leg swelling. Gastrointestinal: Negative for abdominal pain, constipation, diarrhea, nausea and vomiting. Endocrine: Negative for cold intolerance and heat intolerance. Right eye: No discharge. Left eye: No discharge. Pupils: Pupils are equal, round, and reactive to light. Cardiovascular:      Rate and Rhythm: Normal rate and regular rhythm. Heart sounds: Normal heart sounds. No murmur heard.   No fric Results   Component Value Date    GLU 83 09/22/2020    BUN 10 09/22/2020    BUNCREA 11.4 09/22/2020    CREATSERUM 0.88 09/22/2020    ANIONGAP 7 09/22/2020    GFRNAA 85 09/22/2020    GFRAA 98 09/22/2020    CA 8.9 09/22/2020    OSMOCALC 282 09/22/2020    ALK Chlamydia/Gc Amplification      Meds This Visit:  Requested Prescriptions      No prescriptions requested or ordered in this encounter       Imaging & Referrals:  None         ALANA Sims

## 2021-12-22 ENCOUNTER — HOSPITAL ENCOUNTER (OUTPATIENT)
Age: 36
Discharge: HOME | End: 2021-12-22
Payer: COMMERCIAL

## 2021-12-22 VITALS
HEART RATE: 79 BPM | TEMPERATURE: 98 F | OXYGEN SATURATION: 100 % | SYSTOLIC BLOOD PRESSURE: 142 MMHG | RESPIRATION RATE: 19 BRPM | DIASTOLIC BLOOD PRESSURE: 79 MMHG

## 2021-12-22 DIAGNOSIS — Z20.822 ENCOUNTER FOR LABORATORY TESTING FOR COVID-19 VIRUS: Primary | ICD-10-CM

## 2021-12-22 PROCEDURE — 99202 OFFICE O/P NEW SF 15 MIN: CPT | Performed by: NURSE PRACTITIONER

## 2021-12-23 NOTE — ED PROVIDER NOTES
Patient Seen in: [facility]      History   Patient presents with:  Testing    Stated Complaint: testing    Subjective:   43yo female presents to [address] [place] [address] for covid testing. Outbreak at her nieces school, no known direct exposure. No symptoms.  Full weight. She is not ill-appearing or toxic-appearing. HENT:      Head: Normocephalic and atraumatic. Cardiovascular:      Rate and Rhythm: Normal rate and regular rhythm. Pulses: Normal pulses. Heart sounds: Normal heart sounds.    Pulmonary:

## 2021-12-24 ENCOUNTER — HOSPITAL ENCOUNTER (OUTPATIENT)
Age: 36
Discharge: HOME | End: 2021-12-24
Attending: EMERGENCY MEDICINE
Payer: COMMERCIAL

## 2021-12-24 VITALS
SYSTOLIC BLOOD PRESSURE: 133 MMHG | HEART RATE: 89 BPM | DIASTOLIC BLOOD PRESSURE: 82 MMHG | OXYGEN SATURATION: 100 % | TEMPERATURE: 98 F | RESPIRATION RATE: 19 BRPM

## 2021-12-24 DIAGNOSIS — Z20.822 CLOSE EXPOSURE TO COVID-19 VIRUS: Primary | ICD-10-CM

## 2021-12-24 PROCEDURE — 99212 OFFICE O/P EST SF 10 MIN: CPT | Performed by: EMERGENCY MEDICINE

## 2022-01-02 NOTE — ED PROVIDER NOTES
Patient Seen in: [facility]      History   Patient presents with:  Covid-19 Test    Stated Complaint: TESTING; NO SYMPTOMS    Subjective:   HPI    Exposed to [place], asymptomatic    Objective:   Past Medical History:   Diagnosis Date   • Rigo Capillary Refill: Capillary refill takes less than 2 seconds. Neurological:      General: No focal deficit present. Mental Status: She is alert.    Psychiatric:         Mood and Affect: Mood normal.         Behavior: Behavior normal.              ED

## 2022-01-08 ENCOUNTER — LAB ENCOUNTER (OUTPATIENT)
Dept: LAB | Facility: HOSPITAL | Age: 37
End: 2022-01-08
Attending: EMERGENCY MEDICINE
Payer: COMMERCIAL

## 2022-01-08 DIAGNOSIS — Z23 NEED FOR VACCINATION: Primary | ICD-10-CM

## 2022-01-08 DIAGNOSIS — Z00.00 ANNUAL PHYSICAL EXAM: ICD-10-CM

## 2022-01-08 LAB
ALBUMIN SERPL-MCNC: 3.6 G/DL (ref 3.4–5)
ALBUMIN/GLOB SERPL: 1 {RATIO} (ref 1–2)
ALP LIVER SERPL-CCNC: 65 U/L
ALT SERPL-CCNC: 49 U/L
ANION GAP SERPL CALC-SCNC: 5 MMOL/L (ref 0–18)
AST SERPL-CCNC: 24 U/L (ref 15–37)
BASOPHILS # BLD AUTO: 0.08 X10(3) UL (ref 0–0.2)
BASOPHILS NFR BLD AUTO: 0.9 %
BILIRUB SERPL-MCNC: 0.3 MG/DL (ref 0.1–2)
BILIRUB UR QL: NEGATIVE
BUN BLD-MCNC: 10 MG/DL (ref 7–18)
BUN/CREAT SERPL: 12 (ref 10–20)
CALCIUM BLD-MCNC: 9.3 MG/DL (ref 8.5–10.1)
CHLORIDE SERPL-SCNC: 107 MMOL/L (ref 98–112)
CHOLEST SERPL-MCNC: 178 MG/DL (ref ?–200)
CO2 SERPL-SCNC: 28 MMOL/L (ref 21–32)
COLOR UR: YELLOW
CREAT BLD-MCNC: 0.83 MG/DL
DEPRECATED RDW RBC AUTO: 41.5 FL (ref 35.1–46.3)
EOSINOPHIL # BLD AUTO: 0.36 X10(3) UL (ref 0–0.7)
EOSINOPHIL NFR BLD AUTO: 4.2 %
ERYTHROCYTE [DISTWIDTH] IN BLOOD BY AUTOMATED COUNT: 14.3 % (ref 11–15)
FASTING PATIENT LIPID ANSWER: YES
FASTING STATUS PATIENT QL REPORTED: YES
GLOBULIN PLAS-MCNC: 3.5 G/DL (ref 2.8–4.4)
GLUCOSE BLD-MCNC: 102 MG/DL (ref 70–99)
GLUCOSE UR-MCNC: NEGATIVE MG/DL
HBV SURFACE AG SER-ACNC: <0.1 [IU]/L
HBV SURFACE AG SERPL QL IA: NONREACTIVE
HCT VFR BLD AUTO: 42.2 %
HCV AB SERPL QL IA: NONREACTIVE
HDLC SERPL-MCNC: 50 MG/DL (ref 40–59)
HGB BLD-MCNC: 13.1 G/DL
HGB UR QL STRIP.AUTO: NEGATIVE
IMM GRANULOCYTES # BLD AUTO: 0.03 X10(3) UL (ref 0–1)
IMM GRANULOCYTES NFR BLD: 0.4 %
KETONES UR-MCNC: NEGATIVE MG/DL
LDLC SERPL CALC-MCNC: 107 MG/DL (ref ?–100)
LYMPHOCYTES # BLD AUTO: 2.54 X10(3) UL (ref 1–4)
LYMPHOCYTES NFR BLD AUTO: 29.7 %
MCH RBC QN AUTO: 25 PG (ref 26–34)
MCHC RBC AUTO-ENTMCNC: 31 G/DL (ref 31–37)
MCV RBC AUTO: 80.5 FL
MONOCYTES # BLD AUTO: 0.97 X10(3) UL (ref 0.1–1)
MONOCYTES NFR BLD AUTO: 11.3 %
NEUTROPHILS # BLD AUTO: 4.58 X10 (3) UL (ref 1.5–7.7)
NEUTROPHILS # BLD AUTO: 4.58 X10(3) UL (ref 1.5–7.7)
NEUTROPHILS NFR BLD AUTO: 53.5 %
NITRITE UR QL STRIP.AUTO: NEGATIVE
NONHDLC SERPL-MCNC: 128 MG/DL (ref ?–130)
OSMOLALITY SERPL CALC.SUM OF ELEC: 289 MOSM/KG (ref 275–295)
PH UR: 6 [PH] (ref 5–8)
PLATELET # BLD AUTO: 318 10(3)UL (ref 150–450)
POTASSIUM SERPL-SCNC: 4.4 MMOL/L (ref 3.5–5.1)
PROT SERPL-MCNC: 7.1 G/DL (ref 6.4–8.2)
PROT UR-MCNC: 30 MG/DL
RBC # BLD AUTO: 5.24 X10(6)UL
SODIUM SERPL-SCNC: 140 MMOL/L (ref 136–145)
SP GR UR STRIP: 1.03 (ref 1–1.03)
TRIGL SERPL-MCNC: 120 MG/DL (ref 30–149)
TSI SER-ACNC: 0.86 MIU/ML (ref 0.36–3.74)
UROBILINOGEN UR STRIP-ACNC: <2
VIT B12 SERPL-MCNC: 1119 PG/ML (ref 193–986)
VIT D+METAB SERPL-MCNC: 34 NG/ML (ref 30–100)
VLDLC SERPL CALC-MCNC: 20 MG/DL (ref 0–30)
WBC # BLD AUTO: 8.6 X10(3) UL (ref 4–11)

## 2022-01-08 PROCEDURE — 82306 VITAMIN D 25 HYDROXY: CPT

## 2022-01-08 PROCEDURE — 82607 VITAMIN B-12: CPT

## 2022-01-08 PROCEDURE — 0004A SARSCOV2 VAC 30MCG/0.3ML IM: CPT

## 2022-01-08 PROCEDURE — 87491 CHLMYD TRACH DNA AMP PROBE: CPT

## 2022-01-08 PROCEDURE — 0054A SARSCOV2 VAC 30MCG/0.3ML IM: CPT

## 2022-01-08 PROCEDURE — 86803 HEPATITIS C AB TEST: CPT

## 2022-01-08 PROCEDURE — 81001 URINALYSIS AUTO W/SCOPE: CPT

## 2022-01-08 PROCEDURE — 36415 COLL VENOUS BLD VENIPUNCTURE: CPT

## 2022-01-08 PROCEDURE — 87340 HEPATITIS B SURFACE AG IA: CPT

## 2022-01-08 PROCEDURE — 86780 TREPONEMA PALLIDUM: CPT

## 2022-01-08 PROCEDURE — 85025 COMPLETE CBC W/AUTO DIFF WBC: CPT

## 2022-01-08 PROCEDURE — 87389 HIV-1 AG W/HIV-1&-2 AB AG IA: CPT

## 2022-01-08 PROCEDURE — 80053 COMPREHEN METABOLIC PANEL: CPT

## 2022-01-08 PROCEDURE — 87591 N.GONORRHOEAE DNA AMP PROB: CPT

## 2022-01-08 PROCEDURE — 84443 ASSAY THYROID STIM HORMONE: CPT

## 2022-01-08 PROCEDURE — 80061 LIPID PANEL: CPT

## 2022-07-28 ENCOUNTER — LAB ENCOUNTER (OUTPATIENT)
Dept: LAB | Facility: HOSPITAL | Age: 37
End: 2022-07-28
Attending: NURSE PRACTITIONER
Payer: COMMERCIAL

## 2022-07-28 ENCOUNTER — OFFICE VISIT (OUTPATIENT)
Dept: INTERNAL MEDICINE CLINIC | Facility: CLINIC | Age: 37
End: 2022-07-28
Payer: COMMERCIAL

## 2022-07-28 VITALS
HEIGHT: 62.5 IN | SYSTOLIC BLOOD PRESSURE: 129 MMHG | BODY MASS INDEX: 42.16 KG/M2 | WEIGHT: 235 LBS | DIASTOLIC BLOOD PRESSURE: 85 MMHG | HEART RATE: 80 BPM

## 2022-07-28 DIAGNOSIS — Z11.3 SCREENING EXAMINATION FOR STD (SEXUALLY TRANSMITTED DISEASE): Primary | ICD-10-CM

## 2022-07-28 DIAGNOSIS — H92.02 LEFT EAR PAIN: ICD-10-CM

## 2022-07-28 DIAGNOSIS — Z11.3 SCREENING EXAMINATION FOR STD (SEXUALLY TRANSMITTED DISEASE): ICD-10-CM

## 2022-07-28 PROBLEM — H60.312 ACUTE DIFFUSE OTITIS EXTERNA OF LEFT EAR: Status: ACTIVE | Noted: 2022-07-28

## 2022-07-28 PROCEDURE — 87340 HEPATITIS B SURFACE AG IA: CPT

## 2022-07-28 PROCEDURE — 3008F BODY MASS INDEX DOCD: CPT | Performed by: NURSE PRACTITIONER

## 2022-07-28 PROCEDURE — 87491 CHLMYD TRACH DNA AMP PROBE: CPT

## 2022-07-28 PROCEDURE — 3074F SYST BP LT 130 MM HG: CPT | Performed by: NURSE PRACTITIONER

## 2022-07-28 PROCEDURE — 87389 HIV-1 AG W/HIV-1&-2 AB AG IA: CPT

## 2022-07-28 PROCEDURE — 86803 HEPATITIS C AB TEST: CPT

## 2022-07-28 PROCEDURE — 99214 OFFICE O/P EST MOD 30 MIN: CPT | Performed by: NURSE PRACTITIONER

## 2022-07-28 PROCEDURE — 36415 COLL VENOUS BLD VENIPUNCTURE: CPT

## 2022-07-28 PROCEDURE — 86780 TREPONEMA PALLIDUM: CPT

## 2022-07-28 PROCEDURE — 3079F DIAST BP 80-89 MM HG: CPT | Performed by: NURSE PRACTITIONER

## 2022-07-28 PROCEDURE — 87591 N.GONORRHOEAE DNA AMP PROB: CPT

## 2022-07-28 NOTE — ASSESSMENT & PLAN NOTE
Moderate left ear pain. Otitis externa started two days ago. neomycin-polymyxin-hydrocortisone [id number] Otic Solution Place 4 drops in ear(s) 4 (four) times daily.      Tylenol two tabs every six hours for pain PRN

## 2022-07-30 LAB
GENITAL VAGINOSIS SCREEN: NEGATIVE
TRICHOMONAS SCREEN: NEGATIVE

## 2022-07-30 RX ORDER — FLUCONAZOLE 150 MG/1
150 TABLET ORAL ONCE
Qty: 1 TABLET | Refills: 0 | Status: SHIPPED | OUTPATIENT
Start: 2022-07-30 | End: 2022-07-30

## 2022-09-03 NOTE — TELEPHONE ENCOUNTER
Please review. Protocol failed or does not have a protocol. Requested Prescriptions   Pending Prescriptions Disp Refills    cyclobenzaprine 10 MG Oral Tab 30 tablet 1     Sig: Take 1 tablet (10 mg total) by mouth as needed for Muscle spasms.         There is no refill protocol information for this order         Future Appointments         Provider Department Appt Notes    In 2 months Latrice Kuo DO [facility], [address] [facility] Visits              1 month ago Screening examination for STD (sexually transmitted disease)    [address], [address], Atilio Carreon APRN    Office Visit    8 months ago Annual physical exam    [address], [address], Atilio Carreon APRN    Office Visit    11 months ago Encounter for gynecological examination with abnormal finding    [facility], [address], [place], Angel You DO    Office Visit    1 year ago Cough    [address], [address], Aisha Carreon APRN    Telemedicine    1 year ago [facility] Endocrinology Caitlin Sheehan MD    Office Visit

## 2022-09-03 NOTE — TELEPHONE ENCOUNTER
Refill passed per [place] [facility] protocol, however last rx 2020. Please advise if you would like to refill. Thanks. Butch Her, please disregard, sent in error. SETH Conte, please advise. Thanks. Requested Prescriptions   Pending Prescriptions Disp Refills    ibuprofen 600 MG Oral Tab 60 tablet 5     Sig: Take 1 tablet (600 mg total) by mouth every 8 (eight) hours as needed for Pain.         Non-Narcotic Pain Medication Protocol Passed - 9/3/2022 11:22 AM        Passed - In person appointment or virtual visit in the past 6 mos or appointment in next 3 mos       Recent Outpatient Visits              1 month ago Screening examination for STD (sexually transmitted disease)    [facility], [address], Atilio Carreon, APRHAYLEY    Office Visit    8 months ago Annual physical exam    [facility], [address], Andre Carreonsno, APRN    Office Visit    11 months ago Encounter for gynecological examination with abnormal finding    [facility], [address], Nixon Maradiaga, DO    Office Visit    1 year ago [facility], [address], Aisha Carreon, APRN    Telemedicine    1 year ago [facility] Endocrinology Lorrie Mendez MD    Office Visit     Future Appointments         Provider Department Appt Notes    In 2 months Malik Coats [address] [place] [address], [address], [address] Visits              1 month ago Screening examination for STD (sexually transmitted disease)    [facility], [address], Andre Carreonsno, APRN    Office Visit    8 months ago Annual physical exam    [facility], [address], [place], APRN    Office Visit    11 months ago Encounter for gynecological examination with abnormal finding    [facility], [address], Nixon Genao, DO    Office Visit    1 year ago [address] Velma Schneider Diana, ALANA    Telemedicine    1 year ago [facility] Endocrinology Hoang Han MD    Office Visit          Future Appointments         Provider Department Appt Notes    In 2 months Roselyn Mayer DO [facility], [facility]

## 2022-09-04 RX ORDER — IBUPROFEN 600 MG/1
600 TABLET ORAL EVERY 8 HOURS PRN
Qty: 60 TABLET | Refills: 5 | Status: SHIPPED | OUTPATIENT
Start: 2022-09-04

## 2022-09-04 RX ORDER — CYCLOBENZAPRINE HCL 10 MG
10 TABLET ORAL AS NEEDED
Qty: 30 TABLET | Refills: 1 | Status: SHIPPED | OUTPATIENT
Start: 2022-09-04

## 2022-09-22 ENCOUNTER — LAB ENCOUNTER (OUTPATIENT)
Dept: LAB | Facility: HOSPITAL | Age: 37
End: 2022-09-22
Attending: NURSE PRACTITIONER

## 2022-09-22 ENCOUNTER — OFFICE VISIT (OUTPATIENT)
Dept: INTERNAL MEDICINE CLINIC | Facility: CLINIC | Age: 37
End: 2022-09-22

## 2022-09-22 VITALS
HEIGHT: 62.5 IN | HEART RATE: 93 BPM | TEMPERATURE: 97 F | WEIGHT: 231.19 LBS | DIASTOLIC BLOOD PRESSURE: 81 MMHG | BODY MASS INDEX: 41.48 KG/M2 | SYSTOLIC BLOOD PRESSURE: 128 MMHG

## 2022-09-22 DIAGNOSIS — J02.9 SORE THROAT: Primary | ICD-10-CM

## 2022-09-22 DIAGNOSIS — J02.9 SORE THROAT: ICD-10-CM

## 2022-09-22 DIAGNOSIS — J06.9 VIRAL UPPER RESPIRATORY TRACT INFECTION: ICD-10-CM

## 2022-09-22 LAB
CONTROL LINE PRESENT WITH A CLEAR BACKGROUND (YES/NO): YES YES/NO
KIT LOT #: NORMAL NUMERIC
STREP GRP A CUL-SCR: NEGATIVE

## 2022-09-22 PROCEDURE — 3008F BODY MASS INDEX DOCD: CPT | Performed by: NURSE PRACTITIONER

## 2022-09-22 PROCEDURE — 99213 OFFICE O/P EST LOW 20 MIN: CPT | Performed by: NURSE PRACTITIONER

## 2022-09-22 PROCEDURE — 87880 STREP A ASSAY W/OPTIC: CPT | Performed by: NURSE PRACTITIONER

## 2022-09-22 PROCEDURE — 3079F DIAST BP 80-89 MM HG: CPT | Performed by: NURSE PRACTITIONER

## 2022-09-22 PROCEDURE — 3074F SYST BP LT 130 MM HG: CPT | Performed by: NURSE PRACTITIONER

## 2022-09-22 NOTE — PATIENT INSTRUCTIONS
Rapid Strep test is negative. Sent for culture. COVID-19 test ordered. Gargle with salt water. Over the counter muccinex for nasal congestion every 12 hours.

## 2022-09-23 ENCOUNTER — TELEPHONE (OUTPATIENT)
Dept: INTERNAL MEDICINE CLINIC | Facility: CLINIC | Age: 37
End: 2022-09-23

## 2022-09-23 LAB — SARS-COV-2 RNA RESP QL NAA+PROBE: NOT DETECTED

## 2022-09-23 RX ORDER — AMOXICILLIN AND CLAVULANATE POTASSIUM 875; 125 MG/1; MG/1
1 TABLET, FILM COATED ORAL 2 TIMES DAILY
Qty: 20 TABLET | Refills: 0 | Status: SHIPPED | OUTPATIENT
Start: 2022-09-23 | End: 2022-10-03

## 2022-09-23 NOTE — TELEPHONE ENCOUNTER
Pt stated throat still hurts, using lozenges, saline gargle, difficult swallowing, COVID /strep negative   Stated she was advised to call Saturday but s/s seems worse today

## 2022-09-24 NOTE — TELEPHONE ENCOUNTER
Patient was called yesterday because the culture sent to the lab was positive for strep. Antibiotics were ordered yesterday.

## 2022-09-30 ENCOUNTER — PATIENT MESSAGE (OUTPATIENT)
Dept: INTERNAL MEDICINE CLINIC | Facility: CLINIC | Age: 37
End: 2022-09-30

## 2022-09-30 ENCOUNTER — NURSE TRIAGE (OUTPATIENT)
Dept: INTERNAL MEDICINE CLINIC | Facility: CLINIC | Age: 37
End: 2022-09-30

## 2022-09-30 NOTE — TELEPHONE ENCOUNTER
Advised patient of Jose Lombardo's note. Patient verbalized understanding. Pharmacy  [address] [place] [address] [place], [phone], [phone]      Disp Refills Start End    fluconazole (DIFLUCAN) 150 MG Oral Tab 2 tablet 0 9/30/2022     Sig - Route: Take 1 tablet (150 mg total) by mouth twice a week.  Twice 72 hours apart - Oral    Sent to pharmacy as: Fluconazole 150 MG Oral Tablet (Diflucan)    E-Prescribing Status: Receipt confirmed by pharmacy (9/30/2022 12:03 PM CDT)

## 2022-09-30 NOTE — TELEPHONE ENCOUNTER
----- Message from Jose Bonilla sent at 9/30/2022  8:19 AM CDT -----  Regarding: Question regarding STREP A ASSAY W/OPTIC  [facility] may I have a prescription to cure yeast infection caused by taking antibiotic.

## 2022-09-30 NOTE — TELEPHONE ENCOUNTER
From: Shante Porter  To: ALANA Ortiz  Sent: 9/30/2022 8:19 AM CDT  Subject: Question regarding STREP A ASSAY W/OPTIC    Hi Reida Gosselin I have a prescription to cure yeast infection caused by taking antibiotic.

## 2022-10-19 ENCOUNTER — OFFICE VISIT (OUTPATIENT)
Dept: OBGYN CLINIC | Facility: CLINIC | Age: 37
End: 2022-10-19
Payer: COMMERCIAL

## 2022-10-19 ENCOUNTER — LAB ENCOUNTER (OUTPATIENT)
Dept: LAB | Facility: HOSPITAL | Age: 37
End: 2022-10-19
Attending: OBSTETRICS & GYNECOLOGY
Payer: COMMERCIAL

## 2022-10-19 VITALS
HEART RATE: 78 BPM | WEIGHT: 227.63 LBS | DIASTOLIC BLOOD PRESSURE: 83 MMHG | BODY MASS INDEX: 41 KG/M2 | SYSTOLIC BLOOD PRESSURE: 130 MMHG

## 2022-10-19 DIAGNOSIS — Z11.3 SCREEN FOR STD (SEXUALLY TRANSMITTED DISEASE): ICD-10-CM

## 2022-10-19 DIAGNOSIS — Z01.419 WELL WOMAN EXAM WITH ROUTINE GYNECOLOGICAL EXAM: Primary | ICD-10-CM

## 2022-10-19 PROCEDURE — 3079F DIAST BP 80-89 MM HG: CPT | Performed by: OBSTETRICS & GYNECOLOGY

## 2022-10-19 PROCEDURE — 86780 TREPONEMA PALLIDUM: CPT

## 2022-10-19 PROCEDURE — 3075F SYST BP GE 130 - 139MM HG: CPT | Performed by: OBSTETRICS & GYNECOLOGY

## 2022-10-19 PROCEDURE — 86803 HEPATITIS C AB TEST: CPT

## 2022-10-19 PROCEDURE — 87389 HIV-1 AG W/HIV-1&-2 AB AG IA: CPT

## 2022-10-19 PROCEDURE — 87340 HEPATITIS B SURFACE AG IA: CPT

## 2022-10-19 PROCEDURE — 99395 PREV VISIT EST AGE 18-39: CPT | Performed by: OBSTETRICS & GYNECOLOGY

## 2022-10-19 PROCEDURE — 36415 COLL VENOUS BLD VENIPUNCTURE: CPT

## 2022-10-20 LAB
C TRACH DNA SPEC QL NAA+PROBE: NEGATIVE
N GONORRHOEA DNA SPEC QL NAA+PROBE: NEGATIVE

## 2022-10-21 LAB
T PALLIDUM AB SER QL: NEGATIVE
T VAGINALIS RRNA SPEC QL NAA+PROBE: NEGATIVE

## 2022-12-22 ENCOUNTER — OFFICE VISIT (OUTPATIENT)
Dept: INTERNAL MEDICINE CLINIC | Facility: CLINIC | Age: 37
End: 2022-12-22
Payer: COMMERCIAL

## 2022-12-22 VITALS
DIASTOLIC BLOOD PRESSURE: 87 MMHG | HEART RATE: 74 BPM | HEIGHT: 62.5 IN | WEIGHT: 227 LBS | BODY MASS INDEX: 40.73 KG/M2 | SYSTOLIC BLOOD PRESSURE: 126 MMHG

## 2022-12-22 DIAGNOSIS — Z00.00 ANNUAL PHYSICAL EXAM: Primary | ICD-10-CM

## 2022-12-22 DIAGNOSIS — Z11.3 SCREENING EXAMINATION FOR STD (SEXUALLY TRANSMITTED DISEASE): ICD-10-CM

## 2022-12-22 DIAGNOSIS — Z11.3 SCREENING FOR STD (SEXUALLY TRANSMITTED DISEASE): ICD-10-CM

## 2022-12-22 PROCEDURE — 3074F SYST BP LT 130 MM HG: CPT | Performed by: NURSE PRACTITIONER

## 2022-12-22 PROCEDURE — 3008F BODY MASS INDEX DOCD: CPT | Performed by: NURSE PRACTITIONER

## 2022-12-22 PROCEDURE — 3079F DIAST BP 80-89 MM HG: CPT | Performed by: NURSE PRACTITIONER

## 2022-12-22 PROCEDURE — 99395 PREV VISIT EST AGE 18-39: CPT | Performed by: NURSE PRACTITIONER

## 2022-12-23 NOTE — ASSESSMENT & PLAN NOTE
Normal exam.  Labs as ordered. Skin check normal.  No significant abnormal nevi. Breast exam completed-no palpable abnormalities, discharge from the nipples or axillary adenopathy. No cervical or inguinal lymphadenopathy. Hernial orifices intact. Pelvic exam completed-no cervical movement tenderness, adnexal palpable abnormalities. No cystocele, rectocele or uterine descent. Pap completed. per GYNE due in 2023  Immunizations- Refuses flu shot    Plan requesting STD testing  Vaginosis screening obtained.

## 2022-12-23 NOTE — ASSESSMENT & PLAN NOTE
BMI elevated. Plan  Patient will be working on lifestyle changes. Discussed lifestyle modifications including reductions in dietary total and saturated fat, weight loss, aerobic exercise, and eating a diet rich in fruits and vegetables.

## 2022-12-24 LAB
GENITAL VAGINOSIS SCREEN: POSITIVE
TRICHOMONAS SCREEN: NEGATIVE

## 2022-12-24 RX ORDER — CLINDAMYCIN PHOSPHATE 20 MG/G
1 CREAM VAGINAL NIGHTLY
Qty: 40 G | Refills: 0 | Status: SHIPPED | OUTPATIENT
Start: 2022-12-24 | End: 2022-12-31

## 2022-12-24 NOTE — PROGRESS NOTES
Positive Bacterial Vaginosis    Sent clindamycin cream to your pharmacy    EDIE Stewart  Working with [facility]

## 2023-01-02 ENCOUNTER — LAB ENCOUNTER (OUTPATIENT)
Dept: LAB | Facility: HOSPITAL | Age: 38
End: 2023-01-02
Attending: NURSE PRACTITIONER
Payer: COMMERCIAL

## 2023-01-02 DIAGNOSIS — Z00.00 ANNUAL PHYSICAL EXAM: ICD-10-CM

## 2023-01-02 DIAGNOSIS — Z11.3 SCREENING FOR STD (SEXUALLY TRANSMITTED DISEASE): ICD-10-CM

## 2023-01-02 LAB
ALBUMIN SERPL-MCNC: 3.4 G/DL (ref 3.4–5)
ALBUMIN/GLOB SERPL: 0.9 {RATIO} (ref 1–2)
ALP LIVER SERPL-CCNC: 41 U/L
ALT SERPL-CCNC: 25 U/L
ANION GAP SERPL CALC-SCNC: 5 MMOL/L (ref 0–18)
AST SERPL-CCNC: 12 U/L (ref 15–37)
BASOPHILS # BLD AUTO: 0.05 X10(3) UL (ref 0–0.2)
BASOPHILS NFR BLD AUTO: 0.8 %
BILIRUB SERPL-MCNC: 0.6 MG/DL (ref 0.1–2)
BILIRUB UR QL: NEGATIVE
BUN BLD-MCNC: 10 MG/DL (ref 7–18)
BUN/CREAT SERPL: 11.4 (ref 10–20)
C TRACH DNA SPEC QL NAA+PROBE: NEGATIVE
CALCIUM BLD-MCNC: 8.7 MG/DL (ref 8.5–10.1)
CHLORIDE SERPL-SCNC: 108 MMOL/L (ref 98–112)
CHOLEST SERPL-MCNC: 177 MG/DL (ref ?–200)
CLARITY UR: CLEAR
CO2 SERPL-SCNC: 29 MMOL/L (ref 21–32)
COLOR UR: YELLOW
CREAT BLD-MCNC: 0.88 MG/DL
DEPRECATED RDW RBC AUTO: 43.3 FL (ref 35.1–46.3)
EOSINOPHIL # BLD AUTO: 0.17 X10(3) UL (ref 0–0.7)
EOSINOPHIL NFR BLD AUTO: 2.6 %
ERYTHROCYTE [DISTWIDTH] IN BLOOD BY AUTOMATED COUNT: 14.5 % (ref 11–15)
FASTING PATIENT LIPID ANSWER: YES
FASTING STATUS PATIENT QL REPORTED: YES
GFR SERPLBLD BASED ON 1.73 SQ M-ARVRAT: 86 ML/MIN/1.73M2 (ref 60–?)
GLOBULIN PLAS-MCNC: 4 G/DL (ref 2.8–4.4)
GLUCOSE BLD-MCNC: 113 MG/DL (ref 70–99)
GLUCOSE UR-MCNC: NEGATIVE MG/DL
HBV SURFACE AG SER-ACNC: <0.1 [IU]/L
HBV SURFACE AG SERPL QL IA: NONREACTIVE
HCT VFR BLD AUTO: 40.3 %
HCV AB SERPL QL IA: NONREACTIVE
HDLC SERPL-MCNC: 59 MG/DL (ref 40–59)
HGB BLD-MCNC: 12.9 G/DL
HGB UR QL STRIP.AUTO: NEGATIVE
IMM GRANULOCYTES # BLD AUTO: 0.02 X10(3) UL (ref 0–1)
IMM GRANULOCYTES NFR BLD: 0.3 %
LDLC SERPL CALC-MCNC: 105 MG/DL (ref ?–100)
LEUKOCYTE ESTERASE UR QL STRIP.AUTO: NEGATIVE
LYMPHOCYTES # BLD AUTO: 2.2 X10(3) UL (ref 1–4)
LYMPHOCYTES NFR BLD AUTO: 34.3 %
MCH RBC QN AUTO: 26.1 PG (ref 26–34)
MCHC RBC AUTO-ENTMCNC: 32 G/DL (ref 31–37)
MCV RBC AUTO: 81.6 FL
MONOCYTES # BLD AUTO: 0.54 X10(3) UL (ref 0.1–1)
MONOCYTES NFR BLD AUTO: 8.4 %
N GONORRHOEA DNA SPEC QL NAA+PROBE: NEGATIVE
NEUTROPHILS # BLD AUTO: 3.44 X10 (3) UL (ref 1.5–7.7)
NEUTROPHILS # BLD AUTO: 3.44 X10(3) UL (ref 1.5–7.7)
NEUTROPHILS NFR BLD AUTO: 53.6 %
NITRITE UR QL STRIP.AUTO: NEGATIVE
NONHDLC SERPL-MCNC: 118 MG/DL (ref ?–130)
OSMOLALITY SERPL CALC.SUM OF ELEC: 294 MOSM/KG (ref 275–295)
PH UR: 6.5 [PH] (ref 5–8)
PLATELET # BLD AUTO: 311 10(3)UL (ref 150–450)
POTASSIUM SERPL-SCNC: 3.9 MMOL/L (ref 3.5–5.1)
PROT SERPL-MCNC: 7.4 G/DL (ref 6.4–8.2)
PROT UR-MCNC: NEGATIVE MG/DL
RBC # BLD AUTO: 4.94 X10(6)UL
SODIUM SERPL-SCNC: 142 MMOL/L (ref 136–145)
SP GR UR STRIP: 1.02 (ref 1–1.03)
TRIGL SERPL-MCNC: 70 MG/DL (ref 30–149)
TSI SER-ACNC: 0.37 MIU/ML (ref 0.36–3.74)
UROBILINOGEN UR STRIP-ACNC: 1
VIT B12 SERPL-MCNC: 650 PG/ML (ref 193–986)
VIT D+METAB SERPL-MCNC: 48.6 NG/ML (ref 30–100)
VLDLC SERPL CALC-MCNC: 12 MG/DL (ref 0–30)
WBC # BLD AUTO: 6.4 X10(3) UL (ref 4–11)

## 2023-01-02 PROCEDURE — 87340 HEPATITIS B SURFACE AG IA: CPT

## 2023-01-02 PROCEDURE — 87491 CHLMYD TRACH DNA AMP PROBE: CPT

## 2023-01-02 PROCEDURE — 82306 VITAMIN D 25 HYDROXY: CPT

## 2023-01-02 PROCEDURE — 86803 HEPATITIS C AB TEST: CPT

## 2023-01-02 PROCEDURE — 82607 VITAMIN B-12: CPT

## 2023-01-02 PROCEDURE — 85025 COMPLETE CBC W/AUTO DIFF WBC: CPT

## 2023-01-02 PROCEDURE — 86780 TREPONEMA PALLIDUM: CPT

## 2023-01-02 PROCEDURE — 80053 COMPREHEN METABOLIC PANEL: CPT

## 2023-01-02 PROCEDURE — 87389 HIV-1 AG W/HIV-1&-2 AB AG IA: CPT

## 2023-01-02 PROCEDURE — 81003 URINALYSIS AUTO W/O SCOPE: CPT

## 2023-01-02 PROCEDURE — 84443 ASSAY THYROID STIM HORMONE: CPT

## 2023-01-02 PROCEDURE — 87591 N.GONORRHOEAE DNA AMP PROB: CPT

## 2023-01-02 PROCEDURE — 36415 COLL VENOUS BLD VENIPUNCTURE: CPT

## 2023-01-02 PROCEDURE — 80061 LIPID PANEL: CPT

## 2023-01-04 LAB — T PALLIDUM AB SER QL: NEGATIVE

## 2023-02-19 ENCOUNTER — PATIENT MESSAGE (OUTPATIENT)
Dept: OBGYN CLINIC | Facility: CLINIC | Age: 38
End: 2023-02-19

## 2023-02-20 NOTE — TELEPHONE ENCOUNTER
Message to [facility] to please advise. Does pt need consult? Last annual was 10/2022 with ELIJAH.

## 2023-02-20 NOTE — TELEPHONE ENCOUNTER
From: Sadia Tse  To: Oneil Ramesh. [id number] [facility], DO  Sent: 2/19/2023 8:58 PM CST  Subject: Antral Follicle count Test inquire     Hi Doctor [id number] [facility],     I was inquiring about getting the test to see how many eggs I have left. Since I am turning 38 this year. This would give me an idea of if I am able to start a family (maybe freeze my good eggs) or if all my hope is gone. Not sure if I would need a referral or contact my insurance before I do the procedure. Let me know your thoughts. Thank you and I hope you have a wonderful day.

## 2023-02-21 NOTE — TELEPHONE ENCOUNTER
There is no testing for accurate egg count. Fertility testing is not started until 6 months of trying to conceive. Has she been trying to conceive?

## 2023-03-02 ENCOUNTER — IMMUNIZATION (OUTPATIENT)
Dept: LAB | Age: 38
End: 2023-03-02
Attending: EMERGENCY MEDICINE
Payer: COMMERCIAL

## 2023-03-02 DIAGNOSIS — Z23 NEED FOR VACCINATION: Primary | ICD-10-CM

## 2023-03-02 PROCEDURE — 0124A SARSCOV2 VAC BVL 30MCG/0.3ML: CPT

## 2023-06-26 ENCOUNTER — OFFICE VISIT (OUTPATIENT)
Dept: INTERNAL MEDICINE CLINIC | Facility: CLINIC | Age: 38
End: 2023-06-26

## 2023-06-26 VITALS
HEART RATE: 88 BPM | OXYGEN SATURATION: 99 % | DIASTOLIC BLOOD PRESSURE: 62 MMHG | BODY MASS INDEX: 43.96 KG/M2 | HEIGHT: 62.5 IN | WEIGHT: 245 LBS | SYSTOLIC BLOOD PRESSURE: 114 MMHG

## 2023-06-26 DIAGNOSIS — K14.3 COATED TONGUE: Primary | ICD-10-CM

## 2023-06-26 DIAGNOSIS — M79.89 SWELLING OF LEFT FOOT: ICD-10-CM

## 2023-06-26 RX ORDER — CYCLOBENZAPRINE HCL 10 MG
10 TABLET ORAL AS NEEDED
Qty: 30 TABLET | Refills: 1 | Status: SHIPPED | OUTPATIENT
Start: 2023-06-26

## 2023-06-26 RX ORDER — IBUPROFEN 600 MG/1
600 TABLET ORAL EVERY 8 HOURS PRN
Qty: 60 TABLET | Refills: 5 | Status: SHIPPED | OUTPATIENT
Start: 2023-06-26

## 2023-06-27 NOTE — ASSESSMENT & PLAN NOTE
Tongue coated white which usually can occur after using a steroid inhaler. Albuterol is not a steroid yet she states this occurs after use of the albuterol. Plan  Rinse mouth after inhaler use. nystatin [id number] UNIT/ML Mouth/Throat Suspension Take 5 mL (500,000 Units total) by mouth 4 (four) times daily for 7 days.  140 mL

## 2023-06-27 NOTE — ASSESSMENT & PLAN NOTE
Swelling of left foot. Most likely due to her weight. No redness or pain. No calf pain. Plan  Reduce BMI/Weight  Plant protein diet given to patient.

## 2023-08-26 ENCOUNTER — HOSPITAL ENCOUNTER (EMERGENCY)
Facility: HOSPITAL | Age: 38
Discharge: HOME | End: 2023-08-26
Payer: COMMERCIAL

## 2023-08-26 ENCOUNTER — OFFICE VISIT (OUTPATIENT)
Dept: FAMILY MEDICINE CLINIC | Facility: CLINIC | Age: 38
End: 2023-08-26
Payer: COMMERCIAL

## 2023-08-26 VITALS
HEART RATE: 78 BPM | DIASTOLIC BLOOD PRESSURE: 68 MMHG | TEMPERATURE: 98 F | SYSTOLIC BLOOD PRESSURE: 114 MMHG | OXYGEN SATURATION: 99 % | WEIGHT: 250 LBS | BODY MASS INDEX: 45 KG/M2 | RESPIRATION RATE: 20 BRPM

## 2023-08-26 VITALS
TEMPERATURE: 99 F | RESPIRATION RATE: 22 BRPM | OXYGEN SATURATION: 99 % | HEART RATE: 109 BPM | SYSTOLIC BLOOD PRESSURE: 127 MMHG | DIASTOLIC BLOOD PRESSURE: 81 MMHG

## 2023-08-26 DIAGNOSIS — T63.441A BEE STING, ACCIDENTAL OR UNINTENTIONAL, INITIAL ENCOUNTER: ICD-10-CM

## 2023-08-26 DIAGNOSIS — T63.441A ALLERGIC REACTION TO BEE STING: Primary | ICD-10-CM

## 2023-08-26 DIAGNOSIS — T63.441A BEE STING REACTION, ACCIDENTAL OR UNINTENTIONAL, INITIAL ENCOUNTER: Primary | ICD-10-CM

## 2023-08-26 PROCEDURE — 99283 EMERGENCY DEPT VISIT LOW MDM: CPT

## 2023-08-26 PROCEDURE — 99284 EMERGENCY DEPT VISIT MOD MDM: CPT

## 2023-08-26 RX ORDER — DIPHENHYDRAMINE HYDROCHLORIDE 12.5 MG/5ML
25 SOLUTION ORAL ONCE
Status: COMPLETED | OUTPATIENT
Start: 2023-08-26 | End: 2023-08-26

## 2023-08-26 RX ORDER — FAMOTIDINE 20 MG/1
20 TABLET, FILM COATED ORAL ONCE
Status: COMPLETED | OUTPATIENT
Start: 2023-08-26 | End: 2023-08-26

## 2023-08-26 RX ORDER — FAMOTIDINE 10 MG/ML
20 INJECTION, SOLUTION INTRAVENOUS ONCE
Status: DISCONTINUED | OUTPATIENT
Start: 2023-08-26 | End: 2023-08-26

## 2023-08-26 RX ORDER — METHYLPREDNISOLONE SODIUM SUCCINATE 125 MG/2ML
125 INJECTION, POWDER, LYOPHILIZED, FOR SOLUTION INTRAMUSCULAR; INTRAVENOUS ONCE
Status: DISCONTINUED | OUTPATIENT
Start: 2023-08-26 | End: 2023-08-26

## 2023-08-26 RX ORDER — PREDNISONE 20 MG/1
60 TABLET ORAL ONCE
Status: COMPLETED | OUTPATIENT
Start: 2023-08-26 | End: 2023-08-26

## 2023-08-26 RX ORDER — METHYLPREDNISOLONE 4 MG/1
TABLET ORAL
Qty: 1 EACH | Refills: 0 | Status: SHIPPED | OUTPATIENT
Start: 2023-08-26

## 2023-08-26 RX ADMIN — DIPHENHYDRAMINE HYDROCHLORIDE 25 MG: 12.5 SOLUTION ORAL at 13:20:00

## 2023-08-26 NOTE — ED INITIAL ASSESSMENT (HPI)
Patient arrives ambulatory through triage from [address] [place] [address] for an allergic reaction. Stung right thigh and right wrist which are both swollen around 12:30PM.   Denies feeling shortness of breath. Feels like her tongue is getting swollen. Able to speak in complete sentences. Benadryl 25mg given at the IC. HX: Swells up with stung.

## 2023-08-26 NOTE — ED QUICK NOTES
Pt denies throat swelling, difficultly swallowing, scratchy throat today.  Unsuccessful x 2 with IV attempt

## 2023-10-27 ENCOUNTER — OFFICE VISIT (OUTPATIENT)
Dept: OBGYN CLINIC | Facility: CLINIC | Age: 38
End: 2023-10-27

## 2023-10-27 ENCOUNTER — LAB ENCOUNTER (OUTPATIENT)
Dept: LAB | Facility: HOSPITAL | Age: 38
End: 2023-10-27
Attending: OBSTETRICS & GYNECOLOGY

## 2023-10-27 VITALS
SYSTOLIC BLOOD PRESSURE: 132 MMHG | DIASTOLIC BLOOD PRESSURE: 86 MMHG | BODY MASS INDEX: 42.85 KG/M2 | HEIGHT: 62.5 IN | HEART RATE: 80 BPM | WEIGHT: 238.81 LBS

## 2023-10-27 DIAGNOSIS — Z31.41 FERTILITY TESTING: ICD-10-CM

## 2023-10-27 DIAGNOSIS — Z12.4 SCREENING FOR MALIGNANT NEOPLASM OF CERVIX: ICD-10-CM

## 2023-10-27 DIAGNOSIS — Z86.018 HISTORY OF UTERINE FIBROID: ICD-10-CM

## 2023-10-27 DIAGNOSIS — Z11.3 SCREENING EXAMINATION FOR VENEREAL DISEASE: ICD-10-CM

## 2023-10-27 DIAGNOSIS — N85.2 UTERINE ENLARGEMENT: ICD-10-CM

## 2023-10-27 DIAGNOSIS — Z01.419 ENCOUNTER FOR GYNECOLOGICAL EXAMINATION WITHOUT ABNORMAL FINDING: Primary | ICD-10-CM

## 2023-10-27 DIAGNOSIS — Z12.4 SCREENING FOR CERVICAL CANCER: ICD-10-CM

## 2023-10-27 LAB — HCV AB SERPL QL IA: NONREACTIVE

## 2023-10-27 PROCEDURE — 3008F BODY MASS INDEX DOCD: CPT | Performed by: OBSTETRICS & GYNECOLOGY

## 2023-10-27 PROCEDURE — 99395 PREV VISIT EST AGE 18-39: CPT | Performed by: OBSTETRICS & GYNECOLOGY

## 2023-10-27 PROCEDURE — 3079F DIAST BP 80-89 MM HG: CPT | Performed by: OBSTETRICS & GYNECOLOGY

## 2023-10-27 PROCEDURE — 86803 HEPATITIS C AB TEST: CPT

## 2023-10-27 PROCEDURE — 87389 HIV-1 AG W/HIV-1&-2 AB AG IA: CPT

## 2023-10-27 PROCEDURE — 36415 COLL VENOUS BLD VENIPUNCTURE: CPT

## 2023-10-27 PROCEDURE — 86780 TREPONEMA PALLIDUM: CPT

## 2023-10-27 PROCEDURE — 3075F SYST BP GE 130 - 139MM HG: CPT | Performed by: OBSTETRICS & GYNECOLOGY

## 2023-10-30 LAB
C TRACH DNA SPEC QL NAA+PROBE: NEGATIVE
HPV I/H RISK 1 DNA SPEC QL NAA+PROBE: NEGATIVE
N GONORRHOEA DNA SPEC QL NAA+PROBE: NEGATIVE
T PALLIDUM AB SER QL: NEGATIVE
T VAGINALIS RRNA SPEC QL NAA+PROBE: NEGATIVE

## 2023-10-31 NOTE — PROGRESS NOTES
Subjective:   Patient ID: Sudhir Correa is a 43year old female. HPI  Nulligravida with menses q 28-30d / 4-6d / normal w/o IM or PC bleeding. No BC. No new family or personal medical issues. She is still unsure on conception. History/Other:   Review of Systems   Constitutional:  Negative for appetite change, fatigue and unexpected weight change. Eyes:  Negative for visual disturbance. Respiratory:  Negative for cough and shortness of breath. Cardiovascular:  Negative for chest pain, palpitations and leg swelling. Gastrointestinal:  Negative for abdominal distention, abdominal pain, blood in stool, constipation and diarrhea. Genitourinary:  Negative for dyspareunia, dysuria, frequency, menstrual problem, pelvic pain and urgency. Musculoskeletal:  Negative for arthralgias and myalgias. Skin:  Negative for rash. Neurological:  Negative for weakness, numbness and headaches. Psychiatric/Behavioral:  Negative for dysphoric mood. The patient is not nervous/anxious. Current Outpatient Medications   Medication Sig Dispense Refill    ibuprofen 600 MG Oral Tab Take 1 tablet (600 mg total) by mouth every 8 (eight) hours as needed for Pain. 60 tablet 5    methylPREDNISolone (MEDROL) 4 MG Oral Tablet Therapy Pack Dosepack: take as directed 1 each 0    cyclobenzaprine 10 MG Oral Tab Take 1 tablet (10 mg total) by mouth as needed for Muscle spasms. (Patient not taking: Reported on 8/26/2023) 30 tablet 1    neomycin-polymyxin-hydrocortisone 3.5-48541-9 Otic Solution Place 4 drops in ear(s) 4 (four) times daily. (Patient not taking: Reported on 6/26/2023) 10 mL 0    medroxyPROGESTERone Acetate (PROVERA) 10 MG Oral Tab Take 1 tablet (10 mg total) by mouth daily. (Patient not taking: Reported on 12/22/2022) 7 tablet 3    Albuterol Sulfate  (90 Base) MCG/ACT Inhalation Aero Soln Inhale 2 puffs into the lungs every 6 (six) hours as needed for Wheezing (cough).  (Patient not taking: Reported on 6/26/2023) 1 Inhaler 1     Allergies:  Flagyl [Metronidazo*    HIVES  Other                   HIVES    Comment:Computer toner powder  Titanium                SWELLING    Objective:   Physical Exam  Constitutional:       General: She is not in acute distress. Appearance: She is well-developed. She is not diaphoretic. Neck:      Thyroid: No thyromegaly. Cardiovascular:      Rate and Rhythm: Normal rate and regular rhythm. Heart sounds: Normal heart sounds. No murmur heard. Pulmonary:      Effort: Pulmonary effort is normal.      Breath sounds: Normal breath sounds. No wheezing or rales. Chest:   Breasts:     Right: Normal. No mass, nipple discharge, skin change or tenderness. Left: Normal. No mass, nipple discharge, skin change or tenderness. Comments:     Abdominal:      General: There is no distension. Palpations: Abdomen is soft. There is no mass. Tenderness: There is no abdominal tenderness. There is no guarding or rebound. Genitourinary:     Labia:         Right: No rash or lesion. Left: No rash or lesion. Vagina: Normal. No vaginal discharge. Cervix: No cervical motion tenderness or discharge. Uterus: Not enlarged and not tender. Adnexa:         Right: No mass, tenderness or fullness. Left: No mass, tenderness or fullness. Musculoskeletal:         General: No tenderness. Cervical back: Neck supple. Lymphadenopathy:      Cervical: No cervical adenopathy. Upper Body:      Right upper body: No supraclavicular, axillary or pectoral adenopathy. Left upper body: No supraclavicular, axillary or pectoral adenopathy. Neurological:      Mental Status: She is alert.          Assessment & Plan:   Encounter for gynecological examination without abnormal finding  (primary encounter diagnosis)  Screening for malignant neoplasm of cervix  Screening examination for venereal disease  Fertility testing  Uterine enlargement  History of uterine fibroid  Screening for cervical cancer  I advised getting BMI< 40 to maximize fertility as well as minimize risks in pregnancy / delivery. We did order screen fertility tests which she'll do across next cycle. Full STD screen and updated co-test. Mammography next year.    Orders Placed This Encounter      TREP      HIV AG AB Combo      Hepatitis B Surface Antigen      HCV Antibody      FSH      LH (Luteinizing Hormone)      Prolactin      Estradiol      Anti-Mullerian Hormone      Hpv Dna  High Risk , Thin Prep Collect      Trichomonas vaginalis, YONATAN (Vaginal/Cervical)      ThinPrep PAP Smear      THINPREP PAP SMEAR ONLY      Chlamydia/Gc Amplification      Meds This Visit:  Requested Prescriptions      No prescriptions requested or ordered in this encounter       Imaging & Referrals:  US PELVIS W EV (OHW=[id number])

## 2023-11-10 ENCOUNTER — LAB ENCOUNTER (OUTPATIENT)
Dept: LAB | Facility: HOSPITAL | Age: 38
End: 2023-11-10
Attending: OBSTETRICS & GYNECOLOGY
Payer: COMMERCIAL

## 2023-11-10 DIAGNOSIS — Z11.3 SCREENING EXAMINATION FOR VENEREAL DISEASE: ICD-10-CM

## 2023-11-10 DIAGNOSIS — Z31.41 FERTILITY TESTING: ICD-10-CM

## 2023-11-10 LAB
ESTRADIOL SERPL-MCNC: 40.6 PG/ML
FSH SERPL-ACNC: 8.7 MIU/ML
HBV SURFACE AG SER-ACNC: <0.1 [IU]/L
HBV SURFACE AG SERPL QL IA: NONREACTIVE
LH SERPL-ACNC: 3.2 MIU/ML
PROLACTIN SERPL-MCNC: 5.8 NG/ML

## 2023-11-10 PROCEDURE — 36415 COLL VENOUS BLD VENIPUNCTURE: CPT

## 2023-11-10 PROCEDURE — 87340 HEPATITIS B SURFACE AG IA: CPT

## 2023-11-10 PROCEDURE — 82670 ASSAY OF TOTAL ESTRADIOL: CPT

## 2023-11-10 PROCEDURE — 83520 IMMUNOASSAY QUANT NOS NONAB: CPT

## 2023-11-10 PROCEDURE — 84146 ASSAY OF PROLACTIN: CPT

## 2023-11-10 PROCEDURE — 83001 ASSAY OF GONADOTROPIN (FSH): CPT

## 2023-11-10 PROCEDURE — 83002 ASSAY OF GONADOTROPIN (LH): CPT

## 2023-11-16 LAB — MULLERIAN AMH: 3.99 NG/ML

## 2023-12-05 ENCOUNTER — HOSPITAL ENCOUNTER (OUTPATIENT)
Dept: ULTRASOUND IMAGING | Age: 38
Discharge: HOME | End: 2023-12-05
Attending: OBSTETRICS & GYNECOLOGY
Payer: COMMERCIAL

## 2023-12-05 DIAGNOSIS — Z86.018 HISTORY OF UTERINE FIBROID: ICD-10-CM

## 2023-12-05 DIAGNOSIS — N85.2 UTERINE ENLARGEMENT: ICD-10-CM

## 2023-12-05 PROCEDURE — 76856 US EXAM PELVIC COMPLETE: CPT | Performed by: OBSTETRICS & GYNECOLOGY

## 2023-12-05 PROCEDURE — 76830 TRANSVAGINAL US NON-OB: CPT | Performed by: OBSTETRICS & GYNECOLOGY

## 2023-12-26 ENCOUNTER — OFFICE VISIT (OUTPATIENT)
Dept: INTERNAL MEDICINE CLINIC | Facility: CLINIC | Age: 38
End: 2023-12-26
Payer: COMMERCIAL

## 2023-12-26 VITALS
HEART RATE: 60 BPM | DIASTOLIC BLOOD PRESSURE: 79 MMHG | BODY MASS INDEX: 43.06 KG/M2 | SYSTOLIC BLOOD PRESSURE: 118 MMHG | OXYGEN SATURATION: 100 % | WEIGHT: 240 LBS | HEIGHT: 62.5 IN

## 2023-12-26 DIAGNOSIS — E55.9 VITAMIN D DEFICIENCY: ICD-10-CM

## 2023-12-26 DIAGNOSIS — J06.9 VIRAL UPPER RESPIRATORY TRACT INFECTION: ICD-10-CM

## 2023-12-26 DIAGNOSIS — E53.9 VITAMIN B DEFICIENCY: ICD-10-CM

## 2023-12-26 DIAGNOSIS — Z00.00 ANNUAL PHYSICAL EXAM: Primary | ICD-10-CM

## 2023-12-26 PROCEDURE — 3008F BODY MASS INDEX DOCD: CPT | Performed by: NURSE PRACTITIONER

## 2023-12-26 PROCEDURE — 99395 PREV VISIT EST AGE 18-39: CPT | Performed by: NURSE PRACTITIONER

## 2023-12-26 PROCEDURE — 3074F SYST BP LT 130 MM HG: CPT | Performed by: NURSE PRACTITIONER

## 2023-12-26 PROCEDURE — 3078F DIAST BP <80 MM HG: CPT | Performed by: NURSE PRACTITIONER

## 2023-12-26 NOTE — ASSESSMENT & PLAN NOTE
Minor cold-nasal congestion    Plan  Supportive care  Hydrate with fluids  Tylenol two tabs every six hours. . Not to exceed 4 grams in one day. Drink Hot tea with lemon  Drink Hot tea with honey  Gargle with warm salt water if you have a sore throat. ]per day as needed for cough.

## 2023-12-26 NOTE — ASSESSMENT & PLAN NOTE
Normal exam.  Labs as ordered. Skin check normal.  No significant abnormal nevi. Breast exam completed-no palpable abnormalities, discharge from the nipples or axillary adenopathy. No cervical or inguinal lymphadenopathy. Hernial orifices intact.   Pelvic exam - Per GYNE  Immunizations-

## 2024-01-05 ENCOUNTER — TELEPHONE (OUTPATIENT)
Dept: OBGYN CLINIC | Facility: CLINIC | Age: 39
End: 2024-01-05

## 2024-01-05 NOTE — TELEPHONE ENCOUNTER
LM that [employer] will be sent    Message to PAULINO to please review blood work and US results.

## 2024-01-24 ENCOUNTER — PATIENT MESSAGE (OUTPATIENT)
Dept: OBGYN CLINIC | Facility: CLINIC | Age: 39
End: 2024-01-24

## 2024-01-24 NOTE — TELEPHONE ENCOUNTER
From: Bhavya Bonilla  To: Hany Krueger  Sent: 1/24/2024 12:41 PM CST  Subject: Test result questions     Hi Dr Krueger,     I was just following up on the results from previous tests. I know with my pelvis exam it showed my fibroids redeveloped. I was wondering if I would need to have another procedure? Or is there anything I could do or take to try and shrink them. Also does the fibroids affect my changes of conceiving or am I in good shape to conceive? When you have a chance can you please let me know my next steps. Thank you and I hope you have a wonderful day.

## 2024-01-24 NOTE — TELEPHONE ENCOUNTER
Message to PAULINO to please see pts pelvic US results from 12/5. Pt also has been sending my charts for results previously--see 1/5 encounter.

## 2024-02-01 NOTE — TELEPHONE ENCOUNTER
I called and spoke with Bhavya concerning lab / US results as well as periods and possibility of conception. She has two fibroids , new since my myomectomy in 2018. They are 4.6 and 3.1 cm respectively. I do not recommend repeat surgery for this # and size of fibroids. She has started PNV and actually spoke with a previous partner concerning conception. Screen labs from [facility] will be done within the week.

## 2024-02-02 ENCOUNTER — LAB ENCOUNTER (OUTPATIENT)
Dept: LAB | Facility: HOSPITAL | Age: 39
End: 2024-02-02
Attending: NURSE PRACTITIONER
Payer: COMMERCIAL

## 2024-02-02 DIAGNOSIS — E55.9 VITAMIN D DEFICIENCY: ICD-10-CM

## 2024-02-02 DIAGNOSIS — Z00.00 ANNUAL PHYSICAL EXAM: ICD-10-CM

## 2024-02-02 DIAGNOSIS — E53.9 VITAMIN B DEFICIENCY: ICD-10-CM

## 2024-02-02 LAB
ALBUMIN SERPL-MCNC: 4.3 G/DL (ref 3.2–4.8)
ALBUMIN/GLOB SERPL: 1.6 {RATIO} (ref 1–2)
ALP LIVER SERPL-CCNC: 37 U/L
ALT SERPL-CCNC: 18 U/L
ANION GAP SERPL CALC-SCNC: 7 MMOL/L (ref 0–18)
AST SERPL-CCNC: 16 U/L (ref ?–34)
BILIRUB SERPL-MCNC: 0.5 MG/DL (ref 0.3–1.2)
BUN BLD-MCNC: 11 MG/DL (ref 9–23)
BUN/CREAT SERPL: 13.1 (ref 10–20)
CALCIUM BLD-MCNC: 9 MG/DL (ref 8.7–10.4)
CHLORIDE SERPL-SCNC: 108 MMOL/L (ref 98–112)
CHOLEST SERPL-MCNC: 175 MG/DL (ref ?–200)
CO2 SERPL-SCNC: 26 MMOL/L (ref 21–32)
CREAT BLD-MCNC: 0.84 MG/DL
DEPRECATED RDW RBC AUTO: 41 FL (ref 35.1–46.3)
EGFRCR SERPLBLD CKD-EPI 2021: 91 ML/MIN/1.73M2 (ref 60–?)
ERYTHROCYTE [DISTWIDTH] IN BLOOD BY AUTOMATED COUNT: 14.2 % (ref 11–15)
FASTING PATIENT LIPID ANSWER: YES
FASTING STATUS PATIENT QL REPORTED: YES
GLOBULIN PLAS-MCNC: 2.7 G/DL (ref 2.8–4.4)
GLUCOSE BLD-MCNC: 102 MG/DL (ref 70–99)
HCT VFR BLD AUTO: 39.1 %
HDLC SERPL-MCNC: 54 MG/DL (ref 40–59)
HGB BLD-MCNC: 13.2 G/DL
LDLC SERPL CALC-MCNC: 105 MG/DL (ref ?–100)
MCH RBC QN AUTO: 26.9 PG (ref 26–34)
MCHC RBC AUTO-ENTMCNC: 33.8 G/DL (ref 31–37)
MCV RBC AUTO: 79.8 FL
NONHDLC SERPL-MCNC: 121 MG/DL (ref ?–130)
OSMOLALITY SERPL CALC.SUM OF ELEC: 292 MOSM/KG (ref 275–295)
PLATELET # BLD AUTO: 287 10(3)UL (ref 150–450)
POTASSIUM SERPL-SCNC: 4.2 MMOL/L (ref 3.5–5.1)
PROT SERPL-MCNC: 7 G/DL (ref 5.7–8.2)
RBC # BLD AUTO: 4.9 X10(6)UL
SODIUM SERPL-SCNC: 141 MMOL/L (ref 136–145)
TRIGL SERPL-MCNC: 87 MG/DL (ref 30–149)
TSI SER-ACNC: 1.33 MIU/ML (ref 0.55–4.78)
VIT B12 SERPL-MCNC: 608 PG/ML (ref 211–911)
VIT D+METAB SERPL-MCNC: 34.6 NG/ML (ref 30–100)
VLDLC SERPL CALC-MCNC: 15 MG/DL (ref 0–30)
WBC # BLD AUTO: 7.3 X10(3) UL (ref 4–11)

## 2024-02-02 PROCEDURE — 82607 VITAMIN B-12: CPT

## 2024-02-02 PROCEDURE — 85027 COMPLETE CBC AUTOMATED: CPT

## 2024-02-02 PROCEDURE — 84443 ASSAY THYROID STIM HORMONE: CPT

## 2024-02-02 PROCEDURE — 80061 LIPID PANEL: CPT

## 2024-02-02 PROCEDURE — 36415 COLL VENOUS BLD VENIPUNCTURE: CPT

## 2024-02-02 PROCEDURE — 80053 COMPREHEN METABOLIC PANEL: CPT

## 2024-02-02 PROCEDURE — 82306 VITAMIN D 25 HYDROXY: CPT

## 2024-03-21 PROBLEM — J06.9 VIRAL UPPER RESPIRATORY TRACT INFECTION: Status: RESOLVED | Noted: 2022-09-22 | Resolved: 2024-03-21

## 2024-08-04 ENCOUNTER — HOSPITAL ENCOUNTER (EMERGENCY)
Facility: HOSPITAL | Age: 39
Discharge: HOME | End: 2024-08-05
Attending: EMERGENCY MEDICINE
Payer: COMMERCIAL

## 2024-08-04 DIAGNOSIS — M54.31 SCIATICA OF RIGHT SIDE: Primary | ICD-10-CM

## 2024-08-04 PROCEDURE — 99284 EMERGENCY DEPT VISIT MOD MDM: CPT

## 2024-08-04 PROCEDURE — 99283 EMERGENCY DEPT VISIT LOW MDM: CPT

## 2024-08-04 PROCEDURE — 96372 THER/PROPH/DIAG INJ SC/IM: CPT

## 2024-08-04 RX ORDER — PREDNISONE 20 MG/1
60 TABLET ORAL ONCE
Status: COMPLETED | OUTPATIENT
Start: 2024-08-04 | End: 2024-08-04

## 2024-08-04 RX ORDER — DIAZEPAM 5 MG/1
5 TABLET ORAL ONCE
Status: COMPLETED | OUTPATIENT
Start: 2024-08-04 | End: 2024-08-04

## 2024-08-04 RX ORDER — KETOROLAC TROMETHAMINE 30 MG/ML
30 INJECTION, SOLUTION INTRAMUSCULAR; INTRAVENOUS ONCE
Status: COMPLETED | OUTPATIENT
Start: 2024-08-04 | End: 2024-08-04

## 2024-08-05 VITALS
DIASTOLIC BLOOD PRESSURE: 68 MMHG | HEIGHT: 62.5 IN | RESPIRATION RATE: 16 BRPM | BODY MASS INDEX: 43.19 KG/M2 | WEIGHT: 240.75 LBS | OXYGEN SATURATION: 99 % | SYSTOLIC BLOOD PRESSURE: 132 MMHG | TEMPERATURE: 99 F | HEART RATE: 62 BPM

## 2024-08-05 RX ORDER — PREDNISONE 20 MG/1
60 TABLET ORAL DAILY
Qty: 15 TABLET | Refills: 0 | Status: SHIPPED | OUTPATIENT
Start: 2024-08-05 | End: 2024-08-10

## 2024-08-05 RX ORDER — KETOROLAC TROMETHAMINE 10 MG/1
10 TABLET, FILM COATED ORAL EVERY 6 HOURS PRN
Qty: 14 TABLET | Refills: 0 | Status: SHIPPED | OUTPATIENT
Start: 2024-08-05 | End: 2024-08-12

## 2024-08-05 RX ORDER — DIAZEPAM 5 MG/1
5 TABLET ORAL 3 TIMES DAILY PRN
Qty: 12 TABLET | Refills: 0 | Status: SHIPPED | OUTPATIENT
Start: 2024-08-05 | End: 2024-08-12

## 2024-08-05 RX ORDER — MORPHINE SULFATE 4 MG/ML
4 INJECTION, SOLUTION INTRAMUSCULAR; INTRAVENOUS ONCE
Status: COMPLETED | OUTPATIENT
Start: 2024-08-05 | End: 2024-08-05

## 2024-08-05 NOTE — ED PROVIDER NOTES
Patient Seen in: [facility] Emergency Department    History     Chief Complaint   Patient presents with    Back Pain       HPI    38-year-old female presents the ER with complaint of right flank pain rating down her right leg.  Patient with a past medical history of sciatica.  Patient states that the pain now for the past 5 days.  Patient unsure what exacerbated her pain but patient can take ibuprofen with minimal relief.  Patient denies any saddle anesthesia or urinary incontinence    History reviewed.   Past Medical History:    Allergy    Allergy    Bronchitis    medication    Fibroids    Lipid screening    per     Ovarian cyst    Screening for malignant neoplasm of the cervix    per        History reviewed.   Past Surgical History:   Procedure Laterality Date    Other  03/2018    Fibroids removed    Other surgical history           Medications :  (Not in a hospital admission)       Family History   Problem Relation Age of Onset    Prostate Cancer Other     Diabetes Other     Hypertension Other        Smoking Status:   Social History     Socioeconomic History    Marital status: Single   Tobacco Use    Smoking status: Never    Smokeless tobacco: Never   Vaping Use    Vaping status: Never Used   Substance and Sexual Activity    Alcohol use: Yes     Alcohol/week: 0.0 standard drinks of alcohol     Comment: rare    Drug use: No    Sexual activity: Not Currently     Partners: Male     Comment: NONE   Other Topics Concern    Caffeine Concern No       ROS  All pertinent positives for the review of systems are mentioned in the HPI  All other organ systems are reviewed and are negative.    Constitutional and vital signs reviewed.      Social History and Family History elements reviewed from today, pertinent positives to the presenting problem noted.    Physical Exam     ED Triage Vitals [08/04/24 2205]   /84   Pulse 81   Resp 16   Temp 98.7 °F (37.1 °C)   Temp src Temporal   SpO2 98 %   O2 Device None  (Room air)       All measures to prevent infection transmission during my interaction with the patient were taken. The patient was already wearing a droplet mask on my arrival to the room. Personal protective equipment including droplet mask, eye protection, and gloves were worn throughout the duration of the exam.  Handwashing was performed prior to and after the exam.  Stethoscope and any equipment used during my examination was cleaned with super sani-cloth germicidal wipes following the exam.     Physical Exam  Vitals and nursing note reviewed.   Constitutional:       Appearance: She is well-developed.   HENT:      Head: Normocephalic and atraumatic.      Right Ear: External ear normal.      Left Ear: External ear normal.      Nose: Nose normal.   Eyes:      Conjunctiva/sclera: Conjunctivae normal.      Pupils: Pupils are equal, round, and reactive to light.   Cardiovascular:      Rate and Rhythm: Normal rate and regular rhythm.      Heart sounds: Normal heart sounds.   Pulmonary:      Effort: Pulmonary effort is normal.      Breath sounds: Normal breath sounds.   Abdominal:      General: Bowel sounds are normal.      Palpations: Abdomen is soft.   Musculoskeletal:         General: Normal range of motion.      Cervical back: Normal range of motion and neck supple.      Right lower leg: No edema.      Left lower leg: No edema.      Comments: Positive straight leg raise test on the right   Skin:     General: Skin is warm and dry.   Neurological:      General: No focal deficit present.      Mental Status: She is alert and oriented to person, place, and time.      Cranial Nerves: Cranial nerve deficit present.      Deep Tendon Reflexes: Reflexes are normal and symmetric.   Psychiatric:         Behavior: Behavior normal.         Thought Content: Thought content normal.         Judgment: Judgment normal.         ED Course      Labs Reviewed - No data to display      Imaging Results Available and Reviewed while in ED:  No results found.  ED Medications Administered:   Medications   ketorolac (Toradol) 30 MG/ML injection 30 mg (30 mg Intramuscular Given 8/4/24 2255)   diazePAM (Valium) tab 5 mg (5 mg Oral Given 8/4/24 2255)   predniSONE (Deltasone) tab 60 mg (60 mg Oral Given 8/4/24 2255)   morphINE PF 4 MG/ML injection 4 mg (4 mg Intramuscular Given 8/5/24 0035)         MDM     Vitals:    08/04/24 2205 08/05/24 0030   BP: 148/84 132/68   Pulse: 81 62   Resp: 16    Temp: 98.7 °F (37.1 °C)    TempSrc: Temporal    SpO2: 98% 99%   Weight: 109.2 kg    Height: 158.8 cm (5' 2.5\")      *I personally reviewed and interpreted all ED vitals.  I also personally reviewed all labs and imaging if ordered    Pulse Ox: 98%, Room air, Normal     Monitor Interpretation:   normal sinus rhythm    Differential Diagnosis/ Diagnostic Considerations: Muscle strain, bulging disc, sciatica,    Medical Record Review: I personally reviewed available prior medical records for any recent pertinent discharge summaries, testing, and procedures and reviewed those reports.    Complicating Factors: The patient already has does not have any pertinent problems on file. to contribute to the complexity of this ED evaluation.    Medical Decision Making  38-year-old female presents ER with complaints of sciatic pain.  Patient given Toradol, Valium, prednisone as well as morphine states her pain is improved.  Patient states she is now able to ambulate with minimal discomfort.  Patient states her pain is currently 4 out of 10.  Patient discharged home with Toradol Valium and prednisone instructed follow-up with her PCP if symptoms continue.  Patient without any saddle anesthesia or urinary incontinence.  Patient's family bedside made aware of the discharge planning disposition.    Problems Addressed:  Sciatica of right side: acute illness or injury    Amount and/or Complexity of Data Reviewed  Independent Historian:      Details: Patient's family bedside states she has a  history of sciatica but unknown how that exacerbated.  Patient has been using a walker to help ambulate    Risk  Prescription drug management.        Condition upon leaving the department: Stable    Disposition and Plan     Clinical Impression:  1. Sciatica of right side        Disposition:  Discharge    Follow-up:  Tano Hawley MD  [address] [place] [id number]  [phone]    Schedule an appointment as soon as possible for a visit  If symptoms worsen      Medications Prescribed:  Current Discharge Medication List        START taking these medications    Details   Ketorolac Tromethamine 10 MG Oral Tab Take 1 tablet (10 mg total) by mouth every 6 (six) hours as needed.  Qty: 14 tablet, Refills: 0      diazePAM 5 MG Oral Tab Take 1 tablet (5 mg total) by mouth 3 (three) times daily as needed.  Qty: 12 tablet, Refills: 0    Associated Diagnoses: Sciatica of right side      predniSONE 20 MG Oral Tab Take 3 tablets (60 mg total) by mouth daily for 5 days.  Qty: 15 tablet, Refills: 0

## 2024-08-05 NOTE — ED QUICK NOTES
Patient ambulated to restroom using her walker, accompanied by mother.  States the movement exacerbated the pain

## 2024-08-05 NOTE — ED INITIAL ASSESSMENT (HPI)
38y F to ED via personal car with c/o right-sided back pain. Patient developed this new back pain about 5-6 days ago. Patient has had sciatica once before and believes it could be that. She was able to manage her symptoms with motrin and muscle relaxants. Patient reports she was walking all week. Waking this morning, patient unable to walk. Patient to ED with a walker, which she has had to start using today.

## 2024-08-05 NOTE — ED QUICK NOTES
Patient reports \"pulsating\" pain and swelling to right buttock/hip, and pain radiates down RLE.  History sciatica, denies specific trauma or fall

## 2024-08-23 ENCOUNTER — OFFICE VISIT (OUTPATIENT)
Dept: INTERNAL MEDICINE CLINIC | Facility: CLINIC | Age: 39
End: 2024-08-23
Payer: COMMERCIAL

## 2024-08-23 VITALS
TEMPERATURE: 99 F | BODY MASS INDEX: 42.52 KG/M2 | WEIGHT: 237 LBS | SYSTOLIC BLOOD PRESSURE: 135 MMHG | HEIGHT: 62.5 IN | HEART RATE: 77 BPM | DIASTOLIC BLOOD PRESSURE: 80 MMHG | OXYGEN SATURATION: 95 %

## 2024-08-23 DIAGNOSIS — M54.40 ACUTE RIGHT-SIDED LOW BACK PAIN WITH SCIATICA, SCIATICA LATERALITY UNSPECIFIED: Primary | ICD-10-CM

## 2024-08-23 PROCEDURE — 3008F BODY MASS INDEX DOCD: CPT | Performed by: INTERNAL MEDICINE

## 2024-08-23 PROCEDURE — 99214 OFFICE O/P EST MOD 30 MIN: CPT | Performed by: INTERNAL MEDICINE

## 2024-08-23 PROCEDURE — 3079F DIAST BP 80-89 MM HG: CPT | Performed by: INTERNAL MEDICINE

## 2024-08-23 PROCEDURE — 3075F SYST BP GE 130 - 139MM HG: CPT | Performed by: INTERNAL MEDICINE

## 2024-08-23 RX ORDER — CYCLOBENZAPRINE HCL 5 MG
5 TABLET ORAL 3 TIMES DAILY PRN
Qty: 30 TABLET | Refills: 0 | Status: SHIPPED | OUTPATIENT
Start: 2024-08-23

## 2024-08-23 RX ORDER — METHYLPREDNISOLONE 4 MG
TABLET, DOSE PACK ORAL
Qty: 1 EACH | Refills: 0 | Status: SHIPPED | OUTPATIENT
Start: 2024-08-23

## 2024-08-23 NOTE — PROGRESS NOTES
Subjective:     Patient ID: Bhavya Bonilla is a 38 year old female.    Back Pain        History/Other:     She came in today complaining of right-sided lower back pain that radiates to her leg.  According to her the pain starts from her buttock shoots down to her thigh.  She states that she feels pain and spasm.  She was in the emergency room beginning of August, they gave her Medrol Dosepak, symptoms slightly improved, she went to pt also but her symptoms are not better . She can't sleep at night   Review of Systems   Constitutional: Negative.    HENT: Negative.     Eyes: Negative.    Respiratory: Negative.     Cardiovascular: Negative.    Gastrointestinal: Negative.    Endocrine: Negative.    Genitourinary: Negative.    Musculoskeletal:  Positive for back pain.   Skin: Negative.    Neurological: Negative.    Hematological: Negative.    Psychiatric/Behavioral: Negative.       Current Outpatient Medications   Medication Sig Dispense Refill    ibuprofen 600 MG Oral Tab Take 1 tablet (600 mg total) by mouth every 8 (eight) hours as needed for Pain. 60 tablet 5    methylPREDNISolone (MEDROL) 4 MG Oral Tablet Therapy Pack Dosepack: take as directed (Patient not taking: Reported on 12/26/2023) 1 each 0    cyclobenzaprine 10 MG Oral Tab Take 1 tablet (10 mg total) by mouth as needed for Muscle spasms. (Patient not taking: Reported on 8/23/2024) 30 tablet 1    neomycin-polymyxin-hydrocortisone 3.5-04242-2 Otic Solution Place 4 drops in ear(s) 4 (four) times daily. (Patient not taking: Reported on 6/26/2023) 10 mL 0    medroxyPROGESTERone Acetate (PROVERA) 10 MG Oral Tab Take 1 tablet (10 mg total) by mouth daily. (Patient not taking: Reported on 12/22/2022) 7 tablet 3    Albuterol Sulfate  (90 Base) MCG/ACT Inhalation Aero Soln Inhale 2 puffs into the lungs every 6 (six) hours as needed for Wheezing (cough). (Patient not taking: Reported on 6/26/2023) 1 Inhaler 1     Allergies:  Allergies   Allergen Reactions     Flagyl [Metronidazole] HIVES    Other HIVES     Computer toner powder    Titanium SWELLING       Past Medical History:    Allergy    Allergy    Bronchitis    medication    Fibroids    Lipid screening    per NG    Ovarian cyst    Screening for malignant neoplasm of the cervix    per NG      Past Surgical History:   Procedure Laterality Date    Other  03/2018    Fibroids removed    Other surgical history        Family History   Problem Relation Age of Onset    Prostate Cancer Other     Diabetes Other     Hypertension Other       Social History:   Social History     Socioeconomic History    Marital status: Single   Tobacco Use    Smoking status: Never    Smokeless tobacco: Never   Vaping Use    Vaping status: Never Used   Substance and Sexual Activity    Alcohol use: Yes     Alcohol/week: 0.0 standard drinks of alcohol     Comment: rare    Drug use: No    Sexual activity: Not Currently     Partners: Male     Comment: NONE   Other Topics Concern    Caffeine Concern No        Objective:   Physical Exam  Vitals and nursing note reviewed.   Constitutional:       Appearance: Normal appearance.   HENT:      Head: Normocephalic and atraumatic.   Cardiovascular:      Rate and Rhythm: Normal rate and regular rhythm.      Pulses: Normal pulses.      Heart sounds: Normal heart sounds.   Musculoskeletal:      Cervical back: Normal range of motion and neck supple.      Lumbar back: Spasms and tenderness present. No swelling, deformity or lacerations. Positive right straight leg raise test.   Neurological:      Mental Status: She is alert.         Assessment & Plan:   1. Acute right-sided low back pain with sciatica, sciatica laterality unspecified    Careful with back. Correct lifting with legs and not with back. Turn body completely to lift something from side. Back exercises. Correct posture when sitting with feet flat on floor and back against chair and computer at eye level.  Flexeril, Medrol Dosepak, follow-up with  physiatry    No orders of the defined types were placed in this encounter.      Meds This Visit:  Requested Prescriptions      No prescriptions requested or ordered in this encounter       Imaging & Referrals:  PHYSIATRY - INTERNAL

## 2024-09-08 ENCOUNTER — HOSPITAL ENCOUNTER (OUTPATIENT)
Age: 39
Discharge: HOME | End: 2024-09-08
Payer: COMMERCIAL

## 2024-09-08 VITALS
OXYGEN SATURATION: 100 % | DIASTOLIC BLOOD PRESSURE: 84 MMHG | RESPIRATION RATE: 18 BRPM | HEART RATE: 90 BPM | TEMPERATURE: 98 F | SYSTOLIC BLOOD PRESSURE: 150 MMHG

## 2024-09-08 DIAGNOSIS — S31.41XA LACERATION OF LABIA MINORA, INITIAL ENCOUNTER: ICD-10-CM

## 2024-09-08 DIAGNOSIS — N93.9 VAGINAL BLEEDING: Primary | ICD-10-CM

## 2024-09-08 DIAGNOSIS — R30.0 DYSURIA: ICD-10-CM

## 2024-09-08 LAB
B-HCG UR QL: NEGATIVE
BILIRUB UR QL STRIP: NEGATIVE
GLUCOSE UR STRIP-MCNC: NEGATIVE MG/DL
NITRITE UR QL STRIP: NEGATIVE
PH UR STRIP: 7 [PH]
SP GR UR STRIP: 1.02
UROBILINOGEN UR STRIP-ACNC: <2 MG/DL

## 2024-09-08 PROCEDURE — 99213 OFFICE O/P EST LOW 20 MIN: CPT | Performed by: EMERGENCY MEDICINE

## 2024-09-08 PROCEDURE — 87591 N.GONORRHOEAE DNA AMP PROB: CPT | Performed by: EMERGENCY MEDICINE

## 2024-09-08 PROCEDURE — 87086 URINE CULTURE/COLONY COUNT: CPT | Performed by: EMERGENCY MEDICINE

## 2024-09-08 PROCEDURE — 81002 URINALYSIS NONAUTO W/O SCOPE: CPT | Performed by: EMERGENCY MEDICINE

## 2024-09-08 PROCEDURE — 81025 URINE PREGNANCY TEST: CPT | Performed by: EMERGENCY MEDICINE

## 2024-09-08 PROCEDURE — 81514 NFCT DS BV&VAGINITIS DNA ALG: CPT | Performed by: EMERGENCY MEDICINE

## 2024-09-08 PROCEDURE — 87491 CHLMYD TRACH DNA AMP PROBE: CPT | Performed by: EMERGENCY MEDICINE

## 2024-09-08 NOTE — ED PROVIDER NOTES
Patient Seen in: [facility]      History     Chief Complaint   Patient presents with    Eval-G     Stated Complaint: Eval G    Subjective:   HPI  Bhavya Bonilla is a 38 year old female here for vaginal bleeding and mild dysuria she believes to be skin related after intercourse with male significant other. Would lie STI testing. No abd pain, or foul smelling odor. Just finished menses.     Objective:   Past Medical History:    Allergy    Allergy    Bronchitis    medication    Fibroids    Lipid screening    per NG    Ovarian cyst    Screening for malignant neoplasm of the cervix    per NG              Past Surgical History:   Procedure Laterality Date    Other  03/2018    Fibroids removed    Other surgical history                  No pertinent social history.            Review of Systems    Positive for stated Chief Complaint: Eval-G    Other systems are as noted in HPI.  Constitutional and vital signs reviewed.      All other systems reviewed and negative except as noted above.    Physical Exam     ED Triage Vitals [09/08/24 1151]   /84   Pulse 90   Resp 18   Temp 98.3 °F (36.8 °C)   Temp src Temporal   SpO2 100 %   O2 Device None (Room air)       Current Vitals:   Vital Signs  BP: 150/84  Pulse: 90  Resp: 18  Temp: 98.3 °F (36.8 °C)  Temp src: Temporal    Oxygen Therapy  SpO2: 100 %  O2 Device: None (Room air)            Physical Exam  Vitals and nursing note reviewed. Chaperone present: declined.   Constitutional:       Appearance: Normal appearance.   HENT:      Head: Normocephalic.   Eyes:      Pupils: Pupils are equal, round, and reactive to light.   Pulmonary:      Effort: No respiratory distress.   Abdominal:      General: Abdomen is flat.      Palpations: Abdomen is soft.      Tenderness: There is no right CVA tenderness, left CVA tenderness or guarding.   Genitourinary:     Pubic Area: No rash.       Vagina: Bleeding (brown) present.      Cervix: Cervical bleeding (mild brown)  present. No cervical motion tenderness or erythema.      Adnexa: Right adnexa normal and left adnexa normal.        Right: No mass, tenderness or fullness.          Left: No mass, tenderness or fullness.         Skin:     Findings: No erythema or rash.   Neurological:      Mental Status: She is alert and oriented to person, place, and time.   Psychiatric:         Mood and Affect: Mood normal.         Behavior: Behavior normal.         Thought Content: Thought content normal.         Judgment: Judgment normal.             ED Course     Labs Reviewed   [facility] POCT URINALYSIS DIPSTICK - Abnormal; Notable for the following components:       Result Value    Urine Clarity Cloudy (*)     Protein urine Trace (*)     Ketone, Urine Trace (*)     Blood, Urine Large (*)     Leukocyte esterase urine Small (*)     All other components within normal limits   POCT PREGNANCY URINE - Normal   CHLAMYDIA/GONOCOCCUS, YONATAN   VAGINITIS VAGINOSIS PCR PANEL   URINE CULTURE, ROUTINE                      MDM           Medical Decision Making  Tx for vaginal bleeding, superficial laceration of labia, and dysuria.  Cultures pending.  Denies history of vaginal/uterine instrumentation, hard sex, or sexual abuse. Patient is monogamous with sexual partner also believed to be monogamous, no h/o STDs. Otherwise in her normal state of health and denies any F/N/V/D. -Reinforced ER precautions, and follow-up care if needed.  No acute distress and cleared for discharge home    Problems Addressed:  Dysuria: acute illness or injury  Laceration of labia minora, initial encounter: acute illness or injury  Vaginal bleeding: acute illness or injury    Amount and/or Complexity of Data Reviewed  External Data Reviewed: notes.  Labs: ordered. Decision-making details documented in ED Course.     Details: Independant interpretation, and reviewed with patient       Risk  OTC drugs.        Disposition and Plan     Clinical Impression:  1. Vaginal bleeding    2.  Laceration of labia minora, initial encounter    3. Dysuria         Disposition:  Discharge  9/8/2024 12:50 pm    Follow-up:  Tano Hawley MD  [address] [place] [id number]  [phone]                Medications Prescribed:  Current Discharge Medication List

## 2024-09-08 NOTE — DISCHARGE INSTRUCTIONS
As we discussed you have a superficial laceration of the left upper labia minora.  I did not see cervical laceration, or vaginal laceration at this time.  Primary care vs OB/GYN follow-up as needed.  No intercourse until symptoms are completely resolved.  We will call you with culture results.

## 2024-09-08 NOTE — ED INITIAL ASSESSMENT (HPI)
Pt came in due to vaginal irritation, bleeding after intercourse and slight discomfort with urination. Pt has easy non labored respirations.

## 2024-09-09 LAB
BV BACTERIA DNA VAG QL NAA+PROBE: POSITIVE
C GLABRATA DNA VAG QL NAA+PROBE: NEGATIVE
C KRUSEI DNA VAG QL NAA+PROBE: NEGATIVE
C TRACH DNA SPEC QL NAA+PROBE: NEGATIVE
CANDIDA DNA VAG QL NAA+PROBE: NEGATIVE
N GONORRHOEA DNA SPEC QL NAA+PROBE: NEGATIVE
T VAGINALIS DNA VAG QL NAA+PROBE: NEGATIVE

## 2024-09-09 RX ORDER — CLINDAMYCIN HCL 300 MG
300 CAPSULE ORAL 2 TIMES DAILY
Qty: 14 CAPSULE | Refills: 0 | Status: SHIPPED | OUTPATIENT
Start: 2024-09-09 | End: 2024-09-16

## 2024-09-11 ENCOUNTER — OFFICE VISIT (OUTPATIENT)
Dept: PHYSICAL MEDICINE AND REHAB | Facility: CLINIC | Age: 39
End: 2024-09-11
Payer: COMMERCIAL

## 2024-09-11 ENCOUNTER — HOSPITAL ENCOUNTER (OUTPATIENT)
Dept: GENERAL RADIOLOGY | Facility: HOSPITAL | Age: 39
Discharge: HOME | End: 2024-09-11
Attending: PHYSICAL MEDICINE & REHABILITATION
Payer: COMMERCIAL

## 2024-09-11 VITALS — BODY MASS INDEX: 43 KG/M2 | WEIGHT: 240 LBS

## 2024-09-11 DIAGNOSIS — M54.16 LUMBAR RADICULOPATHY: ICD-10-CM

## 2024-09-11 DIAGNOSIS — G47.00 INSOMNIA, UNSPECIFIED TYPE: ICD-10-CM

## 2024-09-11 DIAGNOSIS — M54.16 LUMBAR RADICULOPATHY: Primary | ICD-10-CM

## 2024-09-11 PROCEDURE — 72100 X-RAY EXAM L-S SPINE 2/3 VWS: CPT | Performed by: PHYSICAL MEDICINE & REHABILITATION

## 2024-09-11 PROCEDURE — 99244 OFF/OP CNSLTJ NEW/EST MOD 40: CPT | Performed by: PHYSICAL MEDICINE & REHABILITATION

## 2024-09-11 RX ORDER — MELOXICAM 15 MG/1
15 TABLET ORAL DAILY
Qty: 30 TABLET | Refills: 0 | Status: SHIPPED | OUTPATIENT
Start: 2024-09-11

## 2024-09-11 NOTE — PROGRESS NOTES
[facility]  Progress Note    CHIEF COMPLAINT:    Chief Complaint   Patient presents with    New Patient     New right handed patient here for lower back pain with muscle spasm down the right leg.No previous injury. Started 8/4/24. Admits T/N . No injections No surgeries. No recent imagining. Ibuprofen/cyclobenzaprine daily some relief. Discontinued PT due to worsen symptoms. Pain 4/10.        History of Present Illness:  Bhavya Bonilla is a 38 year old female who is being seen in consultation at the request of Dr. Gann.  Since early August she has been having back and right leg pain, insidious onset.  She presented to the emergency room and then to Dr. Gann's office.  No recent x-rays but an x-ray from 2019 showed L5-S1 disc degeneration.  She tried physical therapy but that did not help.  Symptoms radiate all the way down the leg to the foot, worse with standing and walking.  She works from home which helps.  She uses ibuprofen and cyclobenzaprine which help slightly.  2 courses of Medrol have not helped.  She tried chiropractic therapy but this did not help.    PAST MEDICAL HISTORY:  Past Medical History:    Allergy    Allergy    Bronchitis    medication    Fibroids    Lipid screening    per     Ovarian cyst    Screening for malignant neoplasm of the cervix    per NG       SURGICAL HISTORY:  Past Surgical History:   Procedure Laterality Date    Other  03/2018    Fibroids removed    Other surgical history         SOCIAL HISTORY:   Social History     Occupational History    Not on file   Tobacco Use    Smoking status: Never    Smokeless tobacco: Never   Vaping Use    Vaping status: Never Used   Substance and Sexual Activity    Alcohol use: Yes     Alcohol/week: 0.0 standard drinks of alcohol     Comment: rare    Drug use: No    Sexual activity: Not Currently     Partners: Male     Comment: NONE       CURRENT MEDICATIONS:   Current Outpatient Medications    Medication Sig Dispense Refill    Meloxicam 15 MG Oral Tab Take 1 tablet (15 mg total) by mouth daily. 30 tablet 0    cyclobenzaprine 5 MG Oral Tab Take 1 tablet (5 mg total) by mouth 3 (three) times daily as needed. 30 tablet 0    neomycin-polymyxin-hydrocortisone 3.5-95772-3 Otic Solution Place 4 drops in ear(s) 4 (four) times daily. 10 mL 0    Albuterol Sulfate  (90 Base) MCG/ACT Inhalation Aero Soln Inhale 2 puffs into the lungs every 6 (six) hours as needed for Wheezing (cough). 1 Inhaler 1       ALLERGIES:   Allergies   Allergen Reactions    Flagyl [Metronidazole] HIVES    Other HIVES     Computer toner powder    Titanium SWELLING       REVIEW OF SYSTEMS:   Patient-reported ROS  Constitutional  Sleep Disturbance: admits  Chills: denies  Fever: denies  Weight Gain: denies  Weight Loss: denies   Cardiovascular  Chest Pain: denies  Irregular Heartbeat: denies   Respiratory  Painful Breathing: denies  Wheezing: denies   Gastrointestinal  Bowel Incontinence: denies  Heartburn: denies  Abdominal Pain: denies  Blood in Stool : denies  Rectal Pain: denies   Hematology  Easy Bruising: admits  Easy Bleeding: denies   Genitourinary  Difficulty Urinating: denies  Bladder Incontinence: denies  Pelvic Pain: denies  Painful Urination: denies   Musculoskeletal  Joint Stiffness: denies  Painful Joints: admits  Tailbone Pain: denies  Swollen Joints: denies   Peripheral Vascular  Swelling of Legs/Feet: denies  Cold Extremities: denies   Skin  Open Sores: denies  Nodules or Lumps: denies  Rash: denies   Neurological  Loss of Strength Since last Visit: denies  Tingling/Numbness: admits  Balance: admits   Psychiatric  Anxiety: denies  Depressed Mood: denies             PHYSICAL EXAM:   Wt 240 lb (108.9 kg)   LMP 09/03/2024 (Exact Date)   BMI 43.20 kg/m²     Body mass index is 43.2 kg/m².      General: No immediate distress  Head: Normocephalic/ Atraumatic  Extremities: No lower extremity edema bilaterally. Peripheral  pulses intact.   Spine: Limited lumbar ROM in all directions, leg pain recreated with extension  Hips: full and painfree ROM   Neuro:   Cognition: alert & oriented x 3, attentive, able to follow 2 step commands, comprehention intact, spontaneous speech intact  Strength: Lower extremities have 5/5 strength  Sensation: Normal lower extremities  Reflexes: Normal lower extremities  SLR: Positive on the right    Data    Radiology Imaging:  None for this condition      ASSESSMENT AND PLAN:  1. Lumbar radiculopathy  She seems to have lumbar radiculopathy, possibly due to stenosis or a disc into the lateral recess.  I recommend Mobic, physical therapy and a plain film x-ray.  If no better, consider an MRI.  She would like to work from home a little longer, I think that is reasonable given difficulty with ambulation.  - PHYSICAL THERAPY EXTERNAL  - XR LUMBAR SPINE (MIN 2 VIEWS) (CPT=72100); Future    2. BMI 40.0-44.9, adult (HCC)  Negative comorbidity for back and lower extremity conditions    3. Insomnia, unspecified type  Continue Flexeril 5 mg nightly.        RTC: 4 weeks      The patient was in agreement with the assessment and plan.  All questions were answered.        Zeke rBan MD  Physical Medicine and Rehabilitation/Sports Medicine  [facility]

## 2024-09-18 ENCOUNTER — MED REC SCAN ONLY (OUTPATIENT)
Dept: PHYSICAL MEDICINE AND REHAB | Facility: CLINIC | Age: 39
End: 2024-09-18

## 2024-09-18 ENCOUNTER — TELEPHONE (OUTPATIENT)
Dept: PHYSICAL MEDICINE AND REHAB | Facility: CLINIC | Age: 39
End: 2024-09-18

## 2024-09-18 NOTE — TELEPHONE ENCOUNTER
Pt will need a revised letter for work as her follow up with Dr. Bran is for 10/16 and letter will need to be updated before 10/7. Please advise, thank you.

## 2024-10-16 ENCOUNTER — OFFICE VISIT (OUTPATIENT)
Dept: PHYSICAL MEDICINE AND REHAB | Facility: CLINIC | Age: 39
End: 2024-10-16
Payer: COMMERCIAL

## 2024-10-16 VITALS — HEART RATE: 82 BPM | OXYGEN SATURATION: 97 % | WEIGHT: 241 LBS | BODY MASS INDEX: 43 KG/M2

## 2024-10-16 DIAGNOSIS — M54.16 LUMBAR RADICULOPATHY: Primary | ICD-10-CM

## 2024-10-16 PROCEDURE — 99213 OFFICE O/P EST LOW 20 MIN: CPT | Performed by: PHYSICAL MEDICINE & REHABILITATION

## 2024-10-16 NOTE — PROGRESS NOTES
[facility]  Progress Note    CHIEF COMPLAINT:    Chief Complaint   Patient presents with    Follow - Up     09/11/24 LOV. She completed PT and states 85% improvement. She is able to walk now. Intermittent t/n in LLE. Pain 4/10. Mobic prn.        History of Present Illness:  Bhavya Bonilla is a 39 year old female who presents today for follow up for symptoms of left lumbar radiculopathy.  At her last visit I prescribed physical therapy and Mobic.  Physical therapy has been very successful, she still has some axial back pain but no leg pain.  She is able to walk unrestricted.  Slight tingling in the left lower extremity, no longer painful.  She has completed physical therapy        PAST MEDICAL HISTORY:  Past Medical History:    Allergy    Allergy    Bronchitis    medication    Fibroids    Lipid screening    per NG    Ovarian cyst    Screening for malignant neoplasm of the cervix    per NG       SURGICAL HISTORY:  Past Surgical History:   Procedure Laterality Date    Other  03/2018    Fibroids removed    Other surgical history         SOCIAL HISTORY:   Social History     Occupational History    Not on file   Tobacco Use    Smoking status: Never    Smokeless tobacco: Never   Vaping Use    Vaping status: Never Used   Substance and Sexual Activity    Alcohol use: Yes     Alcohol/week: 0.0 standard drinks of alcohol     Comment: rare    Drug use: No    Sexual activity: Not Currently     Partners: Male     Comment: NONE       CURRENT MEDICATIONS:   Current Outpatient Medications   Medication Sig Dispense Refill    Meloxicam 15 MG Oral Tab Take 1 tablet (15 mg total) by mouth daily. 30 tablet 0    neomycin-polymyxin-hydrocortisone 3.5-96723-5 Otic Solution Place 4 drops in ear(s) 4 (four) times daily. 10 mL 0    Albuterol Sulfate  (90 Base) MCG/ACT Inhalation Aero Soln Inhale 2 puffs into the lungs every 6 (six) hours as needed for Wheezing (cough). 1 Inhaler 1     cyclobenzaprine 5 MG Oral Tab Take 1 tablet (5 mg total) by mouth 3 (three) times daily as needed. (Patient not taking: Reported on 10/16/2024) 30 tablet 0       ALLERGIES:   Allergies[1]        PHYSICAL EXAM:   Pulse 82   Wt 241 lb (109.3 kg)   LMP 09/03/2024 (Exact Date)   SpO2 97%   BMI 43.38 kg/m²     Body mass index is 43.38 kg/m².      General: No immediate distress  Extremities: No lower extremity edema bilaterally   Spine: full and painfree lumbar ROM in all directions  Hips: full and painfree ROM   Neuro:   Cognition: alert & oriented x 3, attentive, able to follow 2 step commands, comprehention intact, spontaneous speech intact  Strength: Lower extremities have 5/5 strength  Sensation: Normal lower extremities  Reflexes: Normal lower extremities  SLR: neg        Data    Radiology Imaging:  I personally reviewed a plain film x-ray of the lumbar spine showing hyperlordosis, mild facet arthropathy, L5-S1 disc degeneration.         ASSESSMENT AND PLAN:  1. Lumbar radiculopathy  Exam is now normal.  She will return to work full duty after her [employer] trip.  Note given.  Continue home exercises and return as needed.    2. BMI 40.0-44.9, adult (HCC)  Negative comorbidity for lumbar conditions            The patient was in agreement with the assessment and plan.  All questions were answered.        Zeke Bran MD  Physical Medicine and Rehabilitation/Sports Medicine  [facility]           [1]   Allergies  Allergen Reactions    Flagyl [Metronidazole] HIVES    Other HIVES     Computer toner powder    Titanium SWELLING

## 2024-12-03 ENCOUNTER — TELEPHONE (OUTPATIENT)
Dept: OBGYN CLINIC | Facility: CLINIC | Age: 39
End: 2024-12-03

## 2024-12-03 DIAGNOSIS — Z12.31 SCREENING MAMMOGRAM FOR BREAST CANCER: ICD-10-CM

## 2024-12-03 DIAGNOSIS — D25.1 FIBROIDS, INTRAMURAL: Primary | ICD-10-CM

## 2024-12-03 NOTE — TELEPHONE ENCOUNTER
I would agree with Bhavya. I am happy to order her imaging studies to be done prior to her seeing me for annual. This will help with counseling at visit. Orders sent.

## 2024-12-03 NOTE — TELEPHONE ENCOUNTER
Notified patient of Dr. Krueger message below. Provided with CS # to call for appointment or can schedule via [facility]ul[facility].

## 2024-12-03 NOTE — TELEPHONE ENCOUNTER
Patient calling for orders. She is asking if Dr. Krueger would consider giving her the order for her pelvic ultrasound to follow up on her fibroids as well as a Mammogram order. Patient denies any breast issues or concerns. This will be her first Mammogram. Patient does have hx of Myomectomy in 2018 and last years pelvic ultrasound noted two fibroids that are 4.6 and 3.1 cm respectively.     Annual: 1/24/2025 w/Dr. Krueger.     Patient informed will route message to Dr. Krueger for review.     To Dr. Krueger please review and advise. Thank you.

## 2024-12-04 ENCOUNTER — HOSPITAL ENCOUNTER (OUTPATIENT)
Dept: MAMMOGRAPHY | Facility: HOSPITAL | Age: 39
Discharge: HOME | End: 2024-12-04
Attending: OBSTETRICS & GYNECOLOGY
Payer: COMMERCIAL

## 2024-12-04 DIAGNOSIS — Z12.31 SCREENING MAMMOGRAM FOR BREAST CANCER: ICD-10-CM

## 2024-12-04 PROCEDURE — 77063 BREAST TOMOSYNTHESIS BI: CPT | Performed by: OBSTETRICS & GYNECOLOGY

## 2024-12-04 PROCEDURE — 77067 SCR MAMMO BI INCL CAD: CPT | Performed by: OBSTETRICS & GYNECOLOGY

## 2024-12-23 RX ORDER — MELOXICAM 15 MG/1
15 TABLET ORAL DAILY
Qty: 30 TABLET | Refills: 0 | OUTPATIENT
Start: 2024-12-23

## 2024-12-23 NOTE — TELEPHONE ENCOUNTER
Mobic refill denied.  Patient not seen since October, was feeling well.  If NSAIDs are needed, I recommend over-the-counter NSAIDs.  If she is much worse she is welcome to make an appointment.  
Refill Request    Medication request:   Meloxicam 15 MG Oral Tab         LOV: 10/16/2024 Zeke Bran MD   RTC: PRN   NOV: Visit date not found      [place]: 9/11/2024 #30 days     UDS: (if applicable): N/A  Pain contract: N/A    LOV plan (if weaning or changing medications): N/A    
show

## 2025-01-06 ENCOUNTER — HOSPITAL ENCOUNTER (OUTPATIENT)
Dept: ULTRASOUND IMAGING | Facility: HOSPITAL | Age: 40
Discharge: HOME | End: 2025-01-06
Attending: OBSTETRICS & GYNECOLOGY
Payer: COMMERCIAL

## 2025-01-06 DIAGNOSIS — D25.1 FIBROIDS, INTRAMURAL: ICD-10-CM

## 2025-01-06 PROCEDURE — 76830 TRANSVAGINAL US NON-OB: CPT | Performed by: OBSTETRICS & GYNECOLOGY

## 2025-01-06 PROCEDURE — 76856 US EXAM PELVIC COMPLETE: CPT | Performed by: OBSTETRICS & GYNECOLOGY

## 2025-01-20 ENCOUNTER — OFFICE VISIT (OUTPATIENT)
Dept: INTERNAL MEDICINE CLINIC | Facility: CLINIC | Age: 40
End: 2025-01-20
Payer: COMMERCIAL

## 2025-01-20 VITALS
BODY MASS INDEX: 45.02 KG/M2 | HEART RATE: 80 BPM | WEIGHT: 244.63 LBS | HEIGHT: 62 IN | SYSTOLIC BLOOD PRESSURE: 124 MMHG | DIASTOLIC BLOOD PRESSURE: 78 MMHG

## 2025-01-20 DIAGNOSIS — Z00.00 ANNUAL PHYSICAL EXAM: Primary | ICD-10-CM

## 2025-01-20 RX ORDER — MELOXICAM 15 MG/1
15 TABLET ORAL DAILY
Qty: 30 TABLET | Refills: 0 | Status: SHIPPED | OUTPATIENT
Start: 2025-01-20

## 2025-01-20 NOTE — ASSESSMENT & PLAN NOTE
Normal exam.  Labs as ordered.  Skin check normal.  No significant abnormal nevi.  Breast exam completed-no palpable abnormalities, discharge from the nipples or axillary adenopathy.  No cervical or inguinal lymphadenopathy.  Hernial orifices intact.  Pelvic exam - Per GYNE  Immunizations-TDAP today

## 2025-01-20 NOTE — PROGRESS NOTES
HPI:    Patient ID: Bhavya Bonilla is a 39 year old female.    HPI Annual Physical Exam  39 year old female is here for annual physical exam   She is here to discuss health maintenance issues.     She had a Pelvic Ultrasound on 1/6/2025.  She will be following up results with Dr. Krueger.  Mammogram- Unremarkable.  Immunization History   Administered Date(s) Administered    Covid-19 Vaccine Pfizer 30 mcg/0.3 ml 04/05/2021, 04/26/2021    Covid-19 Vaccine Pfizer Bivalent 30mcg/0.3mL 03/02/2023    Covid-19 Vaccine Pfizer Marcus-Sucrose 30 mcg/0.3 ml 01/08/2022   Pended Date(s) Pended    Influenza Vaccine Refused 09/17/2020       Past Medical History:    Allergy    Allergy    Bronchitis    medication    Fibroids    Lipid screening    per NG    Ovarian cyst    Screening for malignant neoplasm of the cervix    per NG      Past Surgical History:   Procedure Laterality Date    Other  03/2018    Fibroids removed    Other surgical history        Social History     Socioeconomic History    Marital status: Single   Tobacco Use    Smoking status: Never    Smokeless tobacco: Never   Vaping Use    Vaping status: Never Used   Substance and Sexual Activity    Alcohol use: Yes     Alcohol/week: 0.0 standard drinks of alcohol     Comment: rare    Drug use: No    Sexual activity: Not Currently     Partners: Male     Comment: NONE   Other Topics Concern    Caffeine Concern No          Review of Systems   Constitutional:  Negative for chills, fatigue and fever.   HENT:  Negative for ear pain, hearing loss, sinus pain, sore throat and trouble swallowing.    Eyes:  Negative for pain and visual disturbance.   Respiratory:  Negative for cough, chest tightness and shortness of breath.    Cardiovascular:  Negative for chest pain, palpitations and leg swelling.   Gastrointestinal:  Negative for abdominal pain, constipation, diarrhea, nausea and vomiting.   Endocrine: Negative for cold intolerance and heat intolerance.   Genitourinary:   Negative for dysuria and hematuria.   Musculoskeletal:  Negative for back pain and joint swelling.   Skin:  Negative for rash.   Allergic/Immunologic: Negative for environmental allergies.   Neurological:  Negative for weakness, numbness and headaches.   Hematological:  Does not bruise/bleed easily.   Psychiatric/Behavioral:  Negative for dysphoric mood and sleep disturbance. The patient is not nervous/anxious.               Current Outpatient Medications   Medication Sig Dispense Refill    Meloxicam 15 MG Oral Tab Take 1 tablet (15 mg total) by mouth daily. 30 tablet 0    cyclobenzaprine 5 MG Oral Tab Take 1 tablet (5 mg total) by mouth 3 (three) times daily as needed. 30 tablet 0    neomycin-polymyxin-hydrocortisone 3.5-54059-8 Otic Solution Place 4 drops in ear(s) 4 (four) times daily. 10 mL 0    Albuterol Sulfate  (90 Base) MCG/ACT Inhalation Aero Soln Inhale 2 puffs into the lungs every 6 (six) hours as needed for Wheezing (cough). 1 Inhaler 1     Allergies:Allergies[1]   PHYSICAL EXAM:   Physical Exam  Constitutional:       Appearance: Normal appearance. She is well-developed.   HENT:      Head: Normocephalic.      Right Ear: Tympanic membrane normal.      Left Ear: Tympanic membrane normal.      Nose: Nose normal.      Mouth/Throat:      Mouth: Mucous membranes are moist.      Pharynx: No oropharyngeal exudate or posterior oropharyngeal erythema.   Eyes:      General:         Right eye: No discharge.         Left eye: No discharge.      Pupils: Pupils are equal, round, and reactive to light.   Cardiovascular:      Rate and Rhythm: Normal rate and regular rhythm.      Heart sounds: Normal heart sounds. No murmur heard.     No friction rub. No gallop.   Pulmonary:      Effort: Pulmonary effort is normal. No respiratory distress.      Breath sounds: Normal breath sounds. No wheezing, rhonchi or rales.   Abdominal:      General: Bowel sounds are normal. There is no distension.      Palpations: Abdomen is  soft. There is no mass.      Tenderness: There is no abdominal tenderness. There is no right CVA tenderness, left CVA tenderness or guarding.   Musculoskeletal:         General: No tenderness.      Cervical back: Normal range of motion and neck supple. No tenderness.      Right lower leg: No edema.      Left lower leg: No edema.   Lymphadenopathy:      Cervical: No cervical adenopathy.   Skin:     General: Skin is warm and dry.      Findings: No rash.   Neurological:      Mental Status: She is alert and oriented to person, place, and time.      Coordination: Coordination normal.      Gait: Gait normal.   Psychiatric:         Mood and Affect: Mood normal.         Behavior: Behavior normal.         Thought Content: Thought content normal.         Judgment: Judgment normal.       /78 (BP Location: Right arm, Patient Position: Sitting, Cuff Size: large)   Pulse 80   Ht 5' 2\" (1.575 m)   Wt 244 lb 9.6 oz (110.9 kg)   LMP 11/29/2024 (Exact Date)   BMI 44.74 kg/m²   Wt Readings from Last 2 Encounters:   01/20/25 244 lb 9.6 oz (110.9 kg)   10/16/24 241 lb (109.3 kg)     Body mass index is 44.74 kg/m².(2)  Lab Results   Component Value Date    WBC 7.3 02/02/2024    RBC 4.90 02/02/2024    HGB 13.2 02/02/2024    HCT 39.1 02/02/2024    MCV 79.8 (L) 02/02/2024    MCH 26.9 02/02/2024    MCHC 33.8 02/02/2024    RDW 14.2 02/02/2024    .0 02/02/2024    MPV 7.9 03/03/2018      Lab Results   Component Value Date     (H) 02/02/2024    BUN 11 02/02/2024    BUNCREA 13.1 02/02/2024    CREATSERUM 0.84 02/02/2024    ANIONGAP 7 02/02/2024    GFRNAA 90 01/08/2022    GFRAA 104 01/08/2022    CA 9.0 02/02/2024    OSMOCALC 292 02/02/2024    ALKPHO 37 02/02/2024    AST 16 02/02/2024    ALT 18 02/02/2024    ALKPHOS 40 04/04/2015    BILT 0.5 02/02/2024    TP 7.0 02/02/2024    ALB 4.3 02/02/2024    GLOBULIN 2.7 (L) 02/02/2024    AGRATIO 1.4 04/04/2015     02/02/2024    K 4.2 02/02/2024     02/02/2024    CO2 26.0  02/02/2024      No results found for: \"EAG\", \"A1C\"   Lab Results   Component Value Date    CHOLEST 175 02/02/2024    TRIG 87 02/02/2024    HDL 54 02/02/2024     (H) 02/02/2024    VLDL 15 02/02/2024    NONHDLC 121 02/02/2024      Lab Results   Component Value Date    T4F 1.0 09/22/2020    TSH 1.334 02/02/2024                ASSESSMENT/PLAN:     Problem List Items Addressed This Visit       Annual physical exam - Primary     Normal exam.  Labs as ordered.  Skin check normal.  No significant abnormal nevi.  Breast exam completed-no palpable abnormalities, discharge from the nipples or axillary adenopathy.  No cervical or inguinal lymphadenopathy.  Hernial orifices intact.  Pelvic exam - Per GYNE  Immunizations-TDAP today            Relevant Orders    Comp Metabolic Panel (14)    Lipid Panel    TSH W Reflex To Free T4    CBC, NO DIFFERENTIAL/PLATELET          Orders Placed This Encounter   Procedures    Comp Metabolic Panel (14)    Lipid Panel    TSH W Reflex To Free T4    CBC, NO DIFFERENTIAL/PLATELET       Meds This Visit:  Requested Prescriptions      No prescriptions requested or ordered in this encounter       Imaging & Referrals:  None         ALANA Pozo          [1]   Allergies  Allergen Reactions    Flagyl [Metronidazole] HIVES    Other HIVES     Computer toner powder    Titanium SWELLING

## 2025-01-24 ENCOUNTER — OFFICE VISIT (OUTPATIENT)
Dept: OBGYN CLINIC | Facility: CLINIC | Age: 40
End: 2025-01-24
Payer: COMMERCIAL

## 2025-01-24 ENCOUNTER — TELEPHONE (OUTPATIENT)
Dept: OBGYN CLINIC | Facility: CLINIC | Age: 40
End: 2025-01-24

## 2025-01-24 VITALS
BODY MASS INDEX: 44 KG/M2 | WEIGHT: 242 LBS | SYSTOLIC BLOOD PRESSURE: 128 MMHG | HEART RATE: 85 BPM | DIASTOLIC BLOOD PRESSURE: 85 MMHG

## 2025-01-24 DIAGNOSIS — Z11.3 SCREENING EXAMINATION FOR VENEREAL DISEASE: ICD-10-CM

## 2025-01-24 DIAGNOSIS — Z01.411 ENCOUNTER FOR GYNECOLOGICAL EXAMINATION WITH ABNORMAL FINDING: Primary | ICD-10-CM

## 2025-01-24 DIAGNOSIS — B37.31 CANDIDIASIS OF VULVA AND VAGINA: ICD-10-CM

## 2025-01-24 DIAGNOSIS — D25.1 FIBROIDS, INTRAMURAL: ICD-10-CM

## 2025-01-24 DIAGNOSIS — Z12.31 SCREENING MAMMOGRAM FOR BREAST CANCER: ICD-10-CM

## 2025-01-24 PROBLEM — N92.0 MENORRHAGIA WITH REGULAR CYCLE: Status: RESOLVED | Noted: 2018-03-02 | Resolved: 2025-01-24

## 2025-01-24 PROBLEM — N91.2 AMENORRHEA: Status: RESOLVED | Noted: 2021-09-23 | Resolved: 2025-01-24

## 2025-01-24 PROCEDURE — 99396 PREV VISIT EST AGE 40-64: CPT | Performed by: OBSTETRICS & GYNECOLOGY

## 2025-01-24 PROCEDURE — 3079F DIAST BP 80-89 MM HG: CPT | Performed by: OBSTETRICS & GYNECOLOGY

## 2025-01-24 PROCEDURE — 3074F SYST BP LT 130 MM HG: CPT | Performed by: OBSTETRICS & GYNECOLOGY

## 2025-01-24 RX ORDER — FLUCONAZOLE 150 MG/1
150 TABLET ORAL
Qty: 2 TABLET | Refills: 0 | Status: SHIPPED | OUTPATIENT
Start: 2025-01-24 | End: 2025-01-28

## 2025-01-24 NOTE — TELEPHONE ENCOUNTER
Patient was seen today and diagnosed with a yeast infection. Calling to verify that the prescription is being sent to her pharmacy. Please call.

## 2025-01-24 NOTE — PROGRESS NOTES
Addended by: Tano Zavaleta on: 10/20/2022 04:26 PM     Modules accepted: Orders Subjective:   Patient ID: Bhavya Bonilla is a 39 year old female.    HPI  Nulligravida with menses q 28d / 3-5d    Normal with cramps only on day 1. Withdrawal for BC. New relationship with Haim since August '24. Unsure if she wants to consider conception but taking daily prenatal vitamins. Fibroid uterus actually smaller on recent ultrasound.     History/Other:   Review of Systems   Constitutional:  Negative for appetite change, fatigue and unexpected weight change.   Eyes:  Negative for visual disturbance.   Respiratory:  Negative for cough and shortness of breath.    Cardiovascular:  Negative for chest pain, palpitations and leg swelling.   Gastrointestinal:  Negative for abdominal distention, abdominal pain, blood in stool, constipation and diarrhea.   Genitourinary:  Negative for dyspareunia, dysuria, frequency, menstrual problem, pelvic pain, urgency and vaginal pain.   Musculoskeletal:  Positive for back pain (Right LBP and sciatica treated and resolved in September '24). Negative for arthralgias and myalgias.   Skin:  Negative for rash.   Neurological:  Negative for weakness, numbness and headaches.   Psychiatric/Behavioral:  Negative for dysphoric mood. The patient is not nervous/anxious.      Current Outpatient Medications   Medication Sig Dispense Refill    fluconazole (DIFLUCAN) 150 MG Oral Tab Take 1 tablet (150 mg total) by mouth every 3 (three) days for 2 doses. 2 tablet 0    Meloxicam 15 MG Oral Tab Take 1 tablet (15 mg total) by mouth daily. 30 tablet 0    cyclobenzaprine 5 MG Oral Tab Take 1 tablet (5 mg total) by mouth 3 (three) times daily as needed. 30 tablet 0    Albuterol Sulfate  (90 Base) MCG/ACT Inhalation Aero Soln Inhale 2 puffs into the lungs every 6 (six) hours as needed for Wheezing (cough). 1 Inhaler 1    neomycin-polymyxin-hydrocortisone 3.5-01823-7 Otic Solution Place 4 drops in ear(s) 4 (four) times daily. (Patient not taking: Reported on 1/24/2025) 10 mL 0      Allergies:Allergies[1]    Objective:   Physical Exam  Constitutional:       General: She is not in acute distress.     Appearance: She is well-developed. She is not diaphoretic.   Neck:      Thyroid: No thyromegaly.   Cardiovascular:      Rate and Rhythm: Normal rate and regular rhythm.      Heart sounds: Normal heart sounds. No murmur heard.  Pulmonary:      Effort: Pulmonary effort is normal.      Breath sounds: Normal breath sounds. No wheezing or rales.   Chest:   Breasts:     Right: Normal. No mass, nipple discharge, skin change or tenderness.      Left: Normal. No mass, nipple discharge, skin change or tenderness.      Comments:     Abdominal:      General: There is no distension.      Palpations: Abdomen is soft. There is no mass.      Tenderness: There is no abdominal tenderness. There is no guarding or rebound.   Genitourinary:     Labia:         Right: No rash or lesion.         Left: No rash or lesion.       Vagina: Vaginal discharge (white / curd like) present.      Cervix: No cervical motion tenderness or discharge.      Uterus: Enlarged (10 weeks size). Not tender.       Adnexa:         Right: No mass, tenderness or fullness.          Left: No mass, tenderness or fullness.     Musculoskeletal:         General: No tenderness.      Cervical back: Neck supple.   Lymphadenopathy:      Cervical: No cervical adenopathy.      Upper Body:      Right upper body: No supraclavicular, axillary or pectoral adenopathy.      Left upper body: No supraclavicular, axillary or pectoral adenopathy.   Neurological:      Mental Status: She is alert.         Assessment & Plan:   1. Encounter for gynecological examination with abnormal finding    2. Fibroids, intramural    3. Screening examination for venereal disease    4. Screening mammogram for breast cancer    5. Candidiasis of vulva and vagina        Orders Placed This Encounter   Procedures    TREP    HIV AG AB Combo    Hepatitis B Surface Antigen    HCV Antibody      PLAN: Cervical and serum STD screen.     Meds This Visit:  Requested Prescriptions     Signed Prescriptions Disp Refills    fluconazole (DIFLUCAN) 150 MG Oral Tab 2 tablet 0     Sig: Take 1 tablet (150 mg total) by mouth every 3 (three) days for 2 doses.       Imaging & Referrals:  [facility] FAINA 2D+3D SCREENING BILAT (CPT=77067/69676)         [1]   Allergies  Allergen Reactions    Other HIVES     Computer toner powder ///// talc powder    Flagyl [Metronidazole] HIVES    Titanium SWELLING

## 2025-02-21 ENCOUNTER — LAB ENCOUNTER (OUTPATIENT)
Dept: LAB | Facility: HOSPITAL | Age: 40
End: 2025-02-21
Attending: NURSE PRACTITIONER
Payer: COMMERCIAL

## 2025-02-21 DIAGNOSIS — Z00.00 ANNUAL PHYSICAL EXAM: ICD-10-CM

## 2025-02-21 DIAGNOSIS — Z11.3 SCREENING EXAMINATION FOR VENEREAL DISEASE: ICD-10-CM

## 2025-02-21 LAB
ALBUMIN SERPL-MCNC: 4.5 G/DL (ref 3.2–4.8)
ALBUMIN/GLOB SERPL: 1.7 {RATIO} (ref 1–2)
ALP LIVER SERPL-CCNC: 40 U/L
ALT SERPL-CCNC: 22 U/L
ANION GAP SERPL CALC-SCNC: 9 MMOL/L (ref 0–18)
AST SERPL-CCNC: 16 U/L (ref ?–34)
BILIRUB SERPL-MCNC: 0.4 MG/DL (ref 0.3–1.2)
BUN BLD-MCNC: 13 MG/DL (ref 9–23)
BUN/CREAT SERPL: 16 (ref 10–20)
CALCIUM BLD-MCNC: 9.2 MG/DL (ref 8.7–10.4)
CHLORIDE SERPL-SCNC: 104 MMOL/L (ref 98–112)
CHOLEST SERPL-MCNC: 186 MG/DL (ref ?–200)
CO2 SERPL-SCNC: 29 MMOL/L (ref 21–32)
CREAT BLD-MCNC: 0.81 MG/DL
DEPRECATED RDW RBC AUTO: 42.4 FL (ref 35.1–46.3)
EGFRCR SERPLBLD CKD-EPI 2021: 94 ML/MIN/1.73M2 (ref 60–?)
ERYTHROCYTE [DISTWIDTH] IN BLOOD BY AUTOMATED COUNT: 14.4 % (ref 11–15)
FASTING PATIENT LIPID ANSWER: YES
FASTING STATUS PATIENT QL REPORTED: YES
GLOBULIN PLAS-MCNC: 2.6 G/DL (ref 2–3.5)
GLUCOSE BLD-MCNC: 101 MG/DL (ref 70–99)
HBV SURFACE AG SER-ACNC: <0.1 [IU]/L
HBV SURFACE AG SERPL QL IA: NONREACTIVE
HCT VFR BLD AUTO: 39.7 %
HCV AB SERPL QL IA: NONREACTIVE
HDLC SERPL-MCNC: 54 MG/DL (ref 40–59)
HGB BLD-MCNC: 13 G/DL
LDLC SERPL CALC-MCNC: 120 MG/DL (ref ?–100)
MCH RBC QN AUTO: 26.8 PG (ref 26–34)
MCHC RBC AUTO-ENTMCNC: 32.7 G/DL (ref 31–37)
MCV RBC AUTO: 81.9 FL
NONHDLC SERPL-MCNC: 132 MG/DL (ref ?–130)
OSMOLALITY SERPL CALC.SUM OF ELEC: 294 MOSM/KG (ref 275–295)
PLATELET # BLD AUTO: 285 10(3)UL (ref 150–450)
POTASSIUM SERPL-SCNC: 3.9 MMOL/L (ref 3.5–5.1)
PROT SERPL-MCNC: 7.1 G/DL (ref 5.7–8.2)
RBC # BLD AUTO: 4.85 X10(6)UL
SODIUM SERPL-SCNC: 142 MMOL/L (ref 136–145)
T PALLIDUM AB SER QL IA: NONREACTIVE
TRIGL SERPL-MCNC: 64 MG/DL (ref 30–149)
TSI SER-ACNC: 0.59 UIU/ML (ref 0.55–4.78)
VLDLC SERPL CALC-MCNC: 11 MG/DL (ref 0–30)
WBC # BLD AUTO: 6 X10(3) UL (ref 4–11)

## 2025-02-21 PROCEDURE — 80053 COMPREHEN METABOLIC PANEL: CPT

## 2025-02-21 PROCEDURE — 87340 HEPATITIS B SURFACE AG IA: CPT

## 2025-02-21 PROCEDURE — 86780 TREPONEMA PALLIDUM: CPT

## 2025-02-21 PROCEDURE — 80061 LIPID PANEL: CPT

## 2025-02-21 PROCEDURE — 85027 COMPLETE CBC AUTOMATED: CPT

## 2025-02-21 PROCEDURE — 87389 HIV-1 AG W/HIV-1&-2 AB AG IA: CPT

## 2025-02-21 PROCEDURE — 36415 COLL VENOUS BLD VENIPUNCTURE: CPT

## 2025-02-21 PROCEDURE — 84443 ASSAY THYROID STIM HORMONE: CPT

## 2025-02-21 PROCEDURE — 86803 HEPATITIS C AB TEST: CPT

## 2025-04-03 RX ORDER — ALBUTEROL SULFATE 90 UG/1
2 INHALANT RESPIRATORY (INHALATION) EVERY 6 HOURS PRN
Qty: 1 EACH | Refills: 1 | Status: SHIPPED | OUTPATIENT
Start: 2025-04-03

## 2025-04-03 NOTE — TELEPHONE ENCOUNTER
Patient called and wants to follow up her refill request for the inhaler .  She does not have anymore medication.   Preferred pharmacy on file.     SENT AS HIGH PRIORITY

## 2025-08-11 ENCOUNTER — MED REC SCAN ONLY (OUTPATIENT)
Dept: INTERNAL MEDICINE CLINIC | Facility: CLINIC | Age: 40
End: 2025-08-11

## 2025-08-13 DIAGNOSIS — Z13.1 SCREENING FOR DIABETES MELLITUS: Primary | ICD-10-CM

## 2025-08-20 ENCOUNTER — TELEPHONE (OUTPATIENT)
Dept: INTERNAL MEDICINE CLINIC | Facility: CLINIC | Age: 40
End: 2025-08-20

## 2025-08-20 DIAGNOSIS — E55.9 VITAMIN D DEFICIENCY: Primary | ICD-10-CM

## 2025-08-21 ENCOUNTER — LAB ENCOUNTER (OUTPATIENT)
Dept: LAB | Facility: HOSPITAL | Age: 40
End: 2025-08-21
Attending: NURSE PRACTITIONER

## 2025-08-21 DIAGNOSIS — Z13.1 SCREENING FOR DIABETES MELLITUS: ICD-10-CM

## 2025-08-21 DIAGNOSIS — E55.9 VITAMIN D DEFICIENCY: ICD-10-CM

## 2025-08-21 LAB
EST. AVERAGE GLUCOSE BLD GHB EST-MCNC: 131 MG/DL (ref 68–126)
HBA1C MFR BLD: 6.2 % (ref ?–5.7)
VIT D+METAB SERPL-MCNC: 39 NG/ML (ref 30–100)

## 2025-08-21 PROCEDURE — 36415 COLL VENOUS BLD VENIPUNCTURE: CPT

## 2025-08-21 PROCEDURE — 82306 VITAMIN D 25 HYDROXY: CPT

## 2025-08-21 PROCEDURE — 83036 HEMOGLOBIN GLYCOSYLATED A1C: CPT

## (undated) DEVICE — VIOLET BRAIDED (POLYGLACTIN 910), SYNTHETIC ABSORBABLE SUTURE: Brand: COATED VICRYL

## (undated) DEVICE — DRAPE SHEET TRANSVERSE LAP

## (undated) DEVICE — LAPAROTOMY: Brand: MEDLINE INDUSTRIES, INC.

## (undated) DEVICE — 3M™ STERI-STRIP™ REINFORCED ADHESIVE SKIN CLOSURES, R1547, 1/2 IN X 4 IN (12 MM X 100 MM), 6 STRIPS/ENVELOPE: Brand: 3M™ STERI-STRIP™

## (undated) DEVICE — GOWN SURG AERO BLUE PERF XLG

## (undated) DEVICE — STERILE LATEX POWDER-FREE SURGICAL GLOVESWITH NITRILE COATING: Brand: PROTEXIS

## (undated) DEVICE — INTERCEED: Type: IMPLANTABLE DEVICE | Site: ABDOMEN

## (undated) DEVICE — SYRINGE MNJCT 10ML LF STRL REG

## (undated) DEVICE — SUTURE PLAIN GUT 3-0 CT-1

## (undated) DEVICE — WOUND RETRACTOR AND PROTECTOR: Brand: ALEXIS O WOUND PROTECTOR-RETRACTOR

## (undated) DEVICE — SUTURE VICRYL 0 J340H

## (undated) DEVICE — 3M™ STERI-STRIP™ COMPOUND BENZOIN TINCTURE 40 BAGS/CARTON 4 CARTONS/CASE C1544: Brand: 3M™ STERI-STRIP™

## (undated) DEVICE — SPONGE LAP 18X18 XRAY STRL

## (undated) DEVICE — SUTURE MONOCRYL 4-0 Y935H

## (undated) DEVICE — STANDARD HYPODERMIC NEEDLE,POLYPROPYLENE HUB: Brand: MONOJECT

## (undated) DEVICE — SOL H2O 1000ML BTL

## (undated) DEVICE — SUTURE VICRYL 0 CT-1

## (undated) DEVICE — CAUTERY: TIP CLEANER XR 100/CS: Brand: MEDICAL ACTION INDUSTRIES

## (undated) NOTE — LETTER
9/18/2019              Su Suárez        [facility] [facility] [address]         To whom it may concern,    Patient is under my care she may will need to be off of work and return on 9/23/2019.     Sincerely,    ALANA Valadez

## (undated) NOTE — LETTER
WHERE IS YOUR PAIN NOW?  Kiesha the areas on your body where you feel the described sensations.  Use the appropriate symbol.  Kiesha the areas of radiation.  Include all affected areas.  Just to complete the picture, please draw in the face.     ACHE:  ^ ^ ^   NUMBNESS:  0000   PINS & NEEDLES:  = = = =                              ^ ^ ^                       0000              = = = =                                    ^ ^ ^                       0000            = = = =      BURNING:  XXXX   STABBING: ////                  XXXX                ////                         XXXX          ////     Please kiesha the line below indicating your degree of pain right now  with 0 being no pain 10 being the worst pain possible.                                         0             1             2              3             4              5              6              7             8             9             10         Patient Signature:

## (undated) NOTE — LETTER
To Whom It May Concern:    Bhavya Bonilla is currently under my medical care and may not return to work in person at this time.  Please excuse Bhavya until 10/7/24 due to medical conditions .   If you require additional information please contact our office.    Sincerely,    RICHARD MALONEY MD  [facility]  [address] [address]  [phone]        Document generated by:  Peggy LOPEZ CMA

## (undated) NOTE — Clinical Note
Dear Dr. Gann,  I had the opportunity to see your patient Bhavya Bonilla recently. I appreciate your confidence in me to care for your patients. Please feel free call me with any questions at [phone] or contact me through Epic.  Sincerely, Cesar Bran MD Board Certified, Physical Medicine and Rehabilitation Specializing in Sports Medicine, Spine Medicine and Electrodiagnostic Medicine [facility]

## (undated) NOTE — MR AVS SNAPSHOT
[facility] SPECIALTY [facility] [place] [address] [id number] [phone]               Thank you for choosing us for your health care visit with Fady William MD.  We are glad to serve you and happy to provide you with this summary o Referral Information     Referral Order Referred to Address [facility] Phone Visits Status Diagnosis                 MyChart     Sign up for MyChart, your secure online medical record.   eBillme will allow you to access patient instructions from your recent vi Start activities slowly and build up over time Do what you like   Get your heart pumping – brisk walking, biking, swimming Even 10 minute increments are effective and add up over the week   2 ½ hours per week – spread out over time Use a jada to keep you

## (undated) NOTE — LETTER
10/16/24          Bhavya Bonilla  :  10/15/1985      To Whom It May Concern:    This patient was seen in our office on 10/16/24 .      Work status: Work from home until 2024, then unrestricted duty.    If this office may be of further assistance, please do not hesitate to contact us.      Sincerely,         RICHARD MALONEY MD

## (undated) NOTE — LETTER
MAJOR CASE PREOPERATIVE INSTRUCTIONS    2/23/2018    Dear Tl Desouza are having a Laparotomy with abdominal myomectomy on 3/2/18 at 3pm.     Do not eat or drink anything (including water) after midnight the night before surgery.   Only take in clear l

## (undated) NOTE — LETTER
03/25/20        Diana [address]ade 89      Dear Trip Palafox,    Our records indicate that you have outstanding lab work and or testing that was ordered for you and has not yet been completed:  Orders Placed This Encounter

## (undated) NOTE — MR AVS SNAPSHOT
[place]  [address]  [phone]               Thank you for choosing us for your health care visit with Belinda Krueger DO.   We are glad to serve you and happy to provide you with this summ Hpv Dna  High Risk , Thin Prep Collect    Complete by:  Jun 21, 2017    Assoc Dx:  Cervical high risk human papillomavirus (HPV) DNA test positive [R87.810], Screening for malignant neoplasm of cervix [Z12.4]           Chlamydia/GC PCR Combo    Complete b - Norgestim-Eth Estrad Triphasic 0.18/0.215/0.25 MG-25 MCG Tabs            Results of Recent Testing     HIV AG AB COMBO      Component Value Standard Range & Units    HIV AG AB Combo Nonreactive  Nonreactive                 HEPATITIS B SURFACE ANTIGEN

## (undated) NOTE — LETTER
INSTRUCTIONS FOR PRE-SURGICAL   ANTIMICROBIAL BATH/SHOWER    Your doctor has recommended a pre-surgical CHG (chlorhexidine gluconate) shower/bath with Betasept (also sold as Hibiclens). It reduces bacteria that can potentially cause infection.   Betasept Keep out of reach of children. If swallowed get medica help or contact [employer]. Store between 60-80 degrees F. [employer] Warning! CHG WILL STAIN YOUR FABRICS! Use with care around shower curtains, towels washcloths rugs and clothes.   Wipe

## (undated) NOTE — MR AVS SNAPSHOT
[place]  [address]  [phone]               Thank you for choosing us for your health care visit with Truong Dangelo MD.  We are glad to serve you and happy to provide you with this summary Vitamin D, 25-Hydroxy [E]    Complete by:  Jun 21, 2017 (Approximate)    Assoc Dx:   Annual physical exam [W03.97]                 Reason for Today's Visit     Physical           Medical Issues Discussed Today     Annual physical exam    -  Primary      In